# Patient Record
Sex: FEMALE | Race: BLACK OR AFRICAN AMERICAN | Employment: OTHER | URBAN - METROPOLITAN AREA
[De-identification: names, ages, dates, MRNs, and addresses within clinical notes are randomized per-mention and may not be internally consistent; named-entity substitution may affect disease eponyms.]

---

## 2017-02-15 ENCOUNTER — OFFICE VISIT (OUTPATIENT)
Dept: INTERNAL MEDICINE CLINIC | Age: 82
End: 2017-02-15

## 2017-02-15 VITALS
SYSTOLIC BLOOD PRESSURE: 126 MMHG | DIASTOLIC BLOOD PRESSURE: 65 MMHG | BODY MASS INDEX: 25.72 KG/M2 | RESPIRATION RATE: 16 BRPM | OXYGEN SATURATION: 100 % | HEIGHT: 65 IN | WEIGHT: 154.4 LBS | HEART RATE: 85 BPM | TEMPERATURE: 97.2 F

## 2017-02-15 DIAGNOSIS — Z00.00 ROUTINE GENERAL MEDICAL EXAMINATION AT A HEALTH CARE FACILITY: ICD-10-CM

## 2017-02-15 DIAGNOSIS — Z23 ENCOUNTER FOR IMMUNIZATION: ICD-10-CM

## 2017-02-15 NOTE — PATIENT INSTRUCTIONS
DTaP (Diphtheria, Tetanus, Pertussis) Vaccine: What You Need to Know  Why get vaccinated? Diphtheria, tetanus, and pertussis are serious diseases caused by bacteria. Diphtheria and pertussis are spread from person to person. Tetanus enters the body through cuts or wounds. DIPHTHERIA causes a thick covering in the back of the throat. · It can lead to breathing problems, paralysis, heart failure, and even death. TETANUS (Lockjaw) causes painful tightening of the muscles, usually all over the body. · It can lead to \"locking\" of the jaw so the victim cannot open his mouth or swallow. Tetanus leads to death in up to 2 out of 10 cases. PERTUSSIS (Whooping Cough) causes coughing spells so bad that it is hard for infants to eat, drink, or breathe. These spells can last for weeks. · It can lead to pneumonia, seizures (jerking and staring spells), brain damage, and death. Diphtheria, tetanus, and pertussis vaccine (DTaP) can help prevent these diseases. Most children who are vaccinated with DTaP will be protected throughout childhood. Many more children would get these diseases if we stopped vaccinating. DTaP is a safer version of an older vaccine called DTP. DTP is no longer used in the [place]. Who should get DTaP vaccine and when? Children should get 5 doses of DTaP vaccine, one dose at each of the following ages:  · 2 months  · 4 months  · 6 months  · 1518 months  · 46 years  DTaP may be given at the same time as other vaccines. Some children should not get DTaP vaccine or should wait. · Children with minor illnesses, such as a cold, may be vaccinated. But children who are moderately or severely ill should usually wait until they recover before getting DTaP vaccine. · Any child who had a life-threatening allergic reaction after a dose of DTaP should not get another dose.   · Any child who suffered a brain or nervous system disease within 7 days after a dose of DTaP should not get another dose.  · Talk with your doctor if your child:  Tari Had a seizure or collapsed after a dose of DTaP. ¨ Cried non-stop for 3 hours or more after a dose of DTaP. ¨ Had a fever over 105°F after a dose of DTaP. Ask your doctor for more information. Some of these children should not get another dose of pertussis vaccine, but may get a vaccine without pertussis, called DT. Older children and adults  DTaP is not licensed for adolescents, adults, or children 9years of age and older. But older people still need protection. A vaccine called Tdap is similar to DTaP. A single dose of Tdap is recommended for people 11 through 59years of age. Another vaccine, called Td, protects against tetanus and diphtheria, but not pertussis. It is recommended every 10 years. There are separate Vaccine Information Statements for these vaccines. What are the risks from DTaP vaccine? Getting diphtheria, tetanus, or pertussis disease is much riskier than getting DTaP vaccine. However, a vaccine, like any medicine, is capable of causing serious problems, such as severe allergic reactions. The risk of DTaP vaccine causing serious harm, or death, is extremely small. Mild Problems (Common)  · Fever (up to about 1 child in 4)  · Redness or swelling where the shot was given (up to about 1 child in 4)  · Soreness or tenderness where the shot was given (up to about 1 child in 4)  These problems occur more often after the 4th and 5th doses of the DTaP series than after earlier doses. Sometimes the 4th or 5th dose of DTaP vaccine is followed by swelling of the entire arm or leg in which the shot was given, lasting 17 days (up to about 1 child in 27). Other mild problems include:  · Fussiness (up to about 1 child in 3)  · Tiredness or poor appetite (up to about 1 child in 10)  · Vomiting (up to about 1 child in 48)  These problems generally occur 13 days after the shot.   Moderate Problems (Uncommon)  · Seizure (jerking or staring) (about 1 child out of 14,000)  · Non-stop crying, for 3 hours or more (up to about 1 child out of 1,000)  · High fever, over 105°F (about 1 child out of 16,000)  Severe Problems (Very Rare)  · Serious allergic reaction (less than 1 out of a million doses)  · Several other severe problems have been reported after DTaP vaccine. These include:  ¨ Long-term seizures, coma, or lowered consciousness. ¨ Permanent brain damage. These are so rare it is hard to tell if they are caused by the vaccine. Controlling fever is especially important for children who have had seizures, for any reason. It is also important if another family member has had seizures. You can reduce fever and pain by giving your child an aspirin-free pain reliever when the shot is given, and for the next 24 hours, following the package instructions. What if there is a serious reaction? What should I look for? · Look for anything that concerns you, such as signs of a severe allergic reaction, very high fever, or behavior changes. Signs of a severe allergic reaction can include hives, swelling of the face and throat, difficulty breathing, a fast heartbeat, dizziness, and weakness. These would start a few minutes to a few hours after the vaccination. What should I do? · If you think it is a severe allergic reaction or other emergency that can't wait, call 9-1-1 or get the person to the nearest hospital. Otherwise, call your doctor. · Afterward, the reaction should be reported to the Vaccine Adverse Event Reporting System (VAERS). Your doctor might file this report, or you can do it yourself through the VAERS web site at www.vaers. hhs.gov, or by calling 0-432.882.2361. VAERS is only for reporting reactions. They do not give medical advice. The National Vaccine Injury Compensation Program  The National Vaccine Injury Compensation Program (VICP) is a federal program that was created to compensate people who may have been injured by certain vaccines.   Persons who believe they may have been injured by a vaccine can learn about the program and about filing a claim by calling 0-818.190.2363 or visiting the [address] [employer] website at www.[facility].gov/vaccinecompensation. How can I learn more? · Ask your doctor. · Call your local or state health department. · Contact the Centers for Disease Control and Prevention (CDC):  ¨ Call 4-721.545.4059 (1-800-CDC-INFO) or  ¨ Visit CDC's website at www.cdc.gov/vaccines  Vaccine Information Statement  DTaP (Tetanus, Diphtheria, Pertussis ) Vaccine  (5/17/2007)  [address] [id number]  Department of Health and Human Services  Centers for Disease Control and Prevention  Many Vaccine Information Statements are available in [place] and other languages. See www.immunize.org/vis. Muchas hojas de información sobre vacunas están disponibles en español y en otros idiomas. Visite www.immunize.org/vis. Care instructions adapted under license by your healthcare professional. If you have questions about a medical condition or this instruction, always ask your healthcare professional. [address] any warranty or liability for your use of this information. Meclizine (By mouth)   Meclizine (MEK-li-zeen)  Treats symptoms of motion sickness. Also treats vertigo. Brand Name(s): Antivert, Antivert/25, Antivert/50, [employer] Motion Sickness Relief, Meclicot, Medi-Meclizine, Motion Relief, Motion Sickness Relief, Motion-Time, [employer] Motion Sickness Relief   There may be other brand names for this medicine. When This Medicine Should Not Be Used:   Do not use this medicine if you have had an allergic reaction to meclizine. How to Use This Medicine:   Capsule, Tablet, Chewable Tablet  · Your doctor will tell you how much medicine to use. Do not use more than directed. · Chewable tablet: Chew the tablet for at least 2 minutes. · Motion sickness: Take this medicine at least 1 hour before travel if you are using it to prevent motion sickness. One dose of this medicine should prevent motion sickness for 24 hours. If a dose is missed:   · This medicine is usually taken only as needed. If you are taking meclizine regularly, take your missed dose as soon as you remember. However, if it is almost time for your next dose, wait until then to take the medicine and skip the missed dose. Do not use extra medicine to make up for a missed dose. How to Store and Dispose of This Medicine:   · Store the medicine in a closed container at room temperature, away from heat, moisture, and direct light. · Ask your pharmacist, doctor, or health caregiver about the best way to dispose of any outdated medicine or medicine no longer needed. · Keep all medicine out of the reach of children. Never share your medicine with anyone. Drugs and Foods to Avoid:   Ask your doctor or pharmacist before using any other medicine, including over-the-counter medicines, vitamins, and herbal products. · Do not drink alcohol while you are using this medicine. · Tell your doctor if you use anything else that makes you sleepy. Some examples are allergy medicine, narcotic pain medicine, and alcohol. Warnings While Using This Medicine:   · Make sure your doctor knows if you are pregnant or breastfeeding, or if you have kidney disease, liver disease, asthma, enlarged prostate, glaucoma, heart failure, or trouble urinating. · This medicine may make you drowsy. Do not drive, use machines, or do anything else that could be dangerous until you know how this medicine affects you.   Possible Side Effects While Using This Medicine:   Call your doctor right away if you notice any of these side effects:  · Allergic reaction: Itching or hives, swelling in your face or hands, swelling or tingling in your mouth or throat, chest tightness, trouble breathing  If you notice these less serious side effects, talk with your doctor:   · Blurred vision  · Drowsiness  · Dry mouth  · Headache  If you notice other side effects that you think are caused by this medicine, tell your doctor. Call your doctor for medical advice about side effects. You may report side effects to FDA at 4-475-YCS-6683  © 2016 [address] is for End User's use only and may not be sold, redistributed or otherwise used for commercial purposes. The above information is an  only. It is not intended as medical advice for individual conditions or treatments. Talk to your doctor, nurse or pharmacist before following any medical regimen to see if it is safe and effective for you.

## 2017-02-15 NOTE — PROGRESS NOTES
This is a Subsequent Medicare Annual Wellness Visit providing Personalized Prevention Plan Services (PPPS) (Performed 12 months after initial AWV and PPPS )    I have reviewed the patient's medical history in detail and updated the computerized patient record. History   79 yo female here for [address] Feels well. No complaints at this time other than intermittent vertigo sx. These are chronic and respond to positional changes. Prediabetes which has been well controlled. She is UTD with eye exam (Dr. Evelio Scott). Past Medical History   Diagnosis Date    ASVD (arteriosclerotic vascular disease)     Breast cancer ([facility] [address]      Left breast Cancer 1996    Diabetes ([facility] [address]     Hypercholesterolemia     Hypertension     Radiation therapy      Post Left breast Cancer 1996    Radiation therapy complication 4151     Lt breast      Past Surgical History   Procedure Laterality Date    Hx cataract removal      Hx orthopaedic      Pr breast surgery procedure unlisted       Post Left breast cancer 1996    Hx breast lumpectomy Left 1996     breast cancer    Hx breast biopsy Right ? Scar on right breast pt does not remember from what     Current Outpatient Prescriptions   Medication Sig Dispense Refill    atenolol-chlorthalidone (TENORETIC) 50-25 mg per tablet take 1 tablet by mouth once daily 60 Tab 5    acetaminophen (TYLENOL) 500 mg tablet Take 1 Tab by mouth every six (6) hours as needed for Pain. 30 Tab 0    potassium chloride (K-DUR, KLOR-CON) 20 mEq tablet take 1 tablet by mouth twice a day 120 Tab 5    ipratropium (ATROVENT) 0.06 % nasal spray INSTILL 2 SPRAYS INTO EACH NOSTRIL 4 TIMES A DAY 15 mL 5    simvastatin (ZOCOR) 20 mg tablet take 1 tablet by mouth at bedtime 60 Tab 5    pentoxifylline CR (TRENTAL) 400 mg CR tablet Take 400 mg by mouth three (3) times daily (with meals).  cholecalciferol, VITAMIN D3, (VITAMIN D3) 5,000 unit tab tablet Take  by mouth daily.       mirabegron (MYRBETRIQ) 50 mg Tb24 Take  by mouth.  cycloSPORINE (RESTASIS) 0.05 % ophthalmic emulsion Administer 1 Drop to both eyes two (2) times a day.  glucose blood VI test strips (ONE TOUCH ULTRA TEST) strip Once daily 1 Package 5    glucose blood VI test strips (ASCENSIA CONTOUR) strip by Does Not Apply route daily. 1 Package 11    omega-3 fatty acids-vitamin e (FISH OIL) 1,000 mg Cap Take 1 Cap by mouth.  calcium 500 mg Tab Take  by mouth.  aspirin 81 mg tablet Take 81 mg by mouth.  multivitamin (ONE A DAY) tablet Take 1 Tab by mouth daily.  Blood-Glucose Meter (CONTOUR METER) monitoring kit by Does Not Apply route. Monitor daily 1 Kit 0    diclofenac (VOLTAREN) 1 % gel Apply  to affected area four (4) times daily. 2 g 0    fexofenadine (ALLEGRA) 180 mg tablet Take 1 Tab by mouth daily. 60 Tab 3     Allergies   Allergen Reactions    Sulfa (Sulfonamide Antibiotics) Swelling     Swelling in mouth     History reviewed. No pertinent family history. 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 Requires assistance with: no ADLs    Fall Risk     Fall Risk Assessment, last 12 mths 2/15/2017   Able to walk? Yes   Fall in past 12 months?  No   Uses cane    Abuse Screen   None  Patient is not abused    Review of Systems   Constitutional: negative  Ears, nose, mouth, throat, and face: runny nose during different seasons  Respiratory: negative  Cardiovascular: negative  Gastrointestinal: positive for constipation  Musculoskeletal:negative  Behavioral/Psych: negative    Physical Examination     Evaluation of Cognitive Function:  Mood/affect:  happy  Appearance: age appropriate, well dressed and within normal Limits  Family member/caregiver input: none    General appearance: alert, cooperative, no distress, appears stated age  Nose: no discharge  Lungs: clear to auscultation bilaterally  Heart: regular rate and rhythm, S1, S2 normal, no murmur, click, rub or gallop  Extremities: extremities normal, atraumatic, no cyanosis or edema    Patient Care Team:  Rufina Nelson MD as PCP - General (Internal Medicine)  Bobo Silva RN as Nurse Navigator  Kathryn Barber MD (Ophthalmology)  Tatiana Sin DPM (Podiatry)  Lj Colon MD (Obstetrics & Gynecology)    Advice/Referrals/Counseling   Education and counseling provided:  TDap  UTD with other vaccines. Discussed intermittent vertigo. Provided information on meclizine which she will consider. Assessment/Plan       ICD-10-CM ICD-9-CM    1. Routine general medical examination at a health care facility Z00.00 V70.0    2. Encounter for immunization Z23 V03.89 TETANUS, DIPHTHERIA TOXOIDS AND ACELLULAR PERTUSSIS VACCINE (TDAP), IN INDIVIDS. >=7, IM   TDap today. Doing well functionally. Discussed driving safety. Continued healthy lifestyle.

## 2017-02-15 NOTE — MR AVS SNAPSHOT
Visit Information Date & Time Provider Department Dept. Phone Encounter #  
 2/15/2017 11:15 AM Madison Auguste MD [facility] [phone] [id number] Follow-up Instructions Return in about 6 months (around 8/15/2017), or if symptoms worsen or fail to improve. Upcoming Health Maintenance Date Due DTaP/Tdap/Td series (1 - Tdap) 10/21/1954 EYE EXAM RETINAL OR DILATED Q1 7/24/2013 MEDICARE YEARLY EXAM 1/13/2017 LIPID PANEL Q1 5/13/2017 GLAUCOMA SCREENING Q2Y 8/5/2017 HEMOGLOBIN A1C Q6M 8/15/2017 FOOT EXAM Q1 2/15/2018 MICROALBUMIN Q1 2/15/2018 Allergies as of 2/15/2017  Review Complete On: 2/15/2017 By: Madison Auguste MD  
  
 Severity Noted Reaction Type Reactions Sulfa (Sulfonamide Antibiotics)  02/05/2015    Swelling Swelling in mouth Current Immunizations  Reviewed on 10/22/2014 Name Date Influenza High Dose Vaccine PF 10/14/2016 Influenza Vaccine 10/17/2014 Pneumococcal Conjugate (PCV-13) 10/14/2016, 1/13/2016 Pneumococcal Polysaccharide (PPSV-23) 5/5/2015 Tdap  Incomplete Zoster Vaccine, Live 1/1/2014 Not reviewed this visit You Were Diagnosed With   
  
 Codes Comments Routine general medical examination at a health care facility     ICD-10-CM: Z00.00 ICD-9-CM: V70.0 Encounter for immunization     ICD-10-CM: S07 ICD-9-CM: V03.89 Vitals BP Pulse Temp Resp Height(growth percentile) Weight(growth percentile) 126/65 (BP 1 Location: Left arm, BP Patient Position: Sitting) 85 97.2 °F (36.2 °C) (Oral) 16 5' 5\" (1.651 m) 154 lb 6.4 oz (70 kg) SpO2 BMI OB Status Smoking Status 100% 25.69 kg/m2 Postmenopausal Never Smoker Vitals History BMI and BSA Data Body Mass Index Body Surface Area  
 25.69 kg/m 2 1.79 m 2 Preferred Pharmacy Pharmacy Name Phone [address] [address] [address] [facility] [phone] Your Updated Medication List  
  
   
This list is accurate as of: 2/15/17 11:27 AM.  Always use your most recent med list.  
  
  
  
  
 acetaminophen 500 mg tablet Commonly known as:  TYLENOL Take 1 Tab by mouth every six (6) hours as needed for Pain. aspirin 81 mg tablet Take 81 mg by mouth. atenolol-chlorthalidone 50-25 mg per tablet Commonly known as:  TENORETIC  
take 1 tablet by mouth once daily Blood-Glucose Meter monitoring kit Commonly known as:  CONTOUR METER  
by Does Not Apply route. Monitor daily  
  
 calcium 500 mg Tab Take  by mouth. cholecalciferol (VITAMIN D3) 5,000 unit Tab tablet Commonly known as:  VITAMIN D3 Take  by mouth daily. diclofenac 1 % Gel Commonly known as:  VOLTAREN Apply  to affected area four (4) times daily. fexofenadine 180 mg tablet Commonly known as:  Sumi Cyphers Take 1 Tab by mouth daily. FISH OIL 1,000 mg Cap Generic drug:  omega-3 fatty acids-vitamin e Take 1 Cap by mouth. * glucose blood VI test strips strip Commonly known as:  Ascensia CONTOUR  
by Does Not Apply route daily. * glucose blood VI test strips strip Commonly known as:  ONETOUCH ULTRA TEST Once daily  
  
 ipratropium 0.06 % nasal spray Commonly known as:  ATROVENT INSTILL 2 SPRAYS INTO EACH NOSTRIL 4 TIMES A DAY  
  
 multivitamin tablet Commonly known as:  ONE A DAY Take 1 Tab by mouth daily. MYRBETRIQ 50 mg ER tablet Generic drug:  mirabegron ER Take  by mouth.  
  
 pentoxifylline  mg CR tablet Commonly known as:  TRENTAL Take 400 mg by mouth three (3) times daily (with meals). potassium chloride 20 mEq tablet Commonly known as:  K-DUR, KLOR-CON  
take 1 tablet by mouth twice a day RESTASIS 0.05 % ophthalmic emulsion Generic drug:  cycloSPORINE Administer 1 Drop to both eyes two (2) times a day. simvastatin 20 mg tablet Commonly known as:  ZOCOR  
 take 1 tablet by mouth at bedtime * Notice: This list has 2 medication(s) that are the same as other medications prescribed for you. Read the directions carefully, and ask your doctor or other care provider to review them with you. We Performed the Following TETANUS, DIPHTHERIA TOXOIDS AND ACELLULAR PERTUSSIS VACCINE (TDAP), IN INDIVIDS. >=7, IM [id number] CPT(R)] Follow-up Instructions Return in about 6 months (around 8/15/2017), or if symptoms worsen or fail to improve. Patient Instructions DTaP (Diphtheria, Tetanus, Pertussis) Vaccine: What You Need to Know Why get vaccinated? Diphtheria, tetanus, and pertussis are serious diseases caused by bacteria. Diphtheria and pertussis are spread from person to person. Tetanus enters the body through cuts or wounds. DIPHTHERIA causes a thick covering in the back of the throat. · It can lead to breathing problems, paralysis, heart failure, and even death. TETANUS (Lockjaw) causes painful tightening of the muscles, usually all over the body. · It can lead to \"locking\" of the jaw so the victim cannot open his mouth or swallow. Tetanus leads to death in up to 2 out of 10 cases. PERTUSSIS (Whooping Cough) causes coughing spells so bad that it is hard for infants to eat, drink, or breathe. These spells can last for weeks. · It can lead to pneumonia, seizures (jerking and staring spells), brain damage, and death. Diphtheria, tetanus, and pertussis vaccine (DTaP) can help prevent these diseases. Most children who are vaccinated with DTaP will be protected throughout childhood. Many more children would get these diseases if we stopped vaccinating. DTaP is a safer version of an older vaccine called DTP. DTP is no longer used in the [place]. Who should get DTaP vaccine and when? Children should get 5 doses of DTaP vaccine, one dose at each of the following ages: · 2 months · 4 months · 6 months · 1518 months · 46 years DTaP may be given at the same time as other vaccines. Some children should not get DTaP vaccine or should wait. · Children with minor illnesses, such as a cold, may be vaccinated. But children who are moderately or severely ill should usually wait until they recover before getting DTaP vaccine. · Any child who had a life-threatening allergic reaction after a dose of DTaP should not get another dose. · Any child who suffered a brain or nervous system disease within 7 days after a dose of DTaP should not get another dose. · Talk with your doctor if your child: 
[place].Levels Had a seizure or collapsed after a dose of DTaP. ¨ Cried non-stop for 3 hours or more after a dose of DTaP. ¨ Had a fever over 105°F after a dose of DTaP. Ask your doctor for more information. Some of these children should not get another dose of pertussis vaccine, but may get a vaccine without pertussis, called DT. Older children and adults DTaP is not licensed for adolescents, adults, or children 9years of age and older. But older people still need protection. A vaccine called Tdap is similar to DTaP. A single dose of Tdap is recommended for people 11 through 59years of age. Another vaccine, called Td, protects against tetanus and diphtheria, but not pertussis. It is recommended every 10 years. There are separate Vaccine Information Statements for these vaccines. What are the risks from DTaP vaccine? Getting diphtheria, tetanus, or pertussis disease is much riskier than getting DTaP vaccine. However, a vaccine, like any medicine, is capable of causing serious problems, such as severe allergic reactions. The risk of DTaP vaccine causing serious harm, or death, is extremely small. Mild Problems (Common) · Fever (up to about 1 child in 4) · Redness or swelling where the shot was given (up to about 1 child in 4) · Soreness or tenderness where the shot was given (up to about 1 child in 4) These problems occur more often after the 4th and 5th doses of the DTaP series than after earlier doses. Sometimes the 4th or 5th dose of DTaP vaccine is followed by swelling of the entire arm or leg in which the shot was given, lasting 17 days (up to about 1 child in 27). Other mild problems include: · Fussiness (up to about 1 child in 3) · Tiredness or poor appetite (up to about 1 child in 10) · Vomiting (up to about 1 child in 48) These problems generally occur 13 days after the shot. Moderate Problems (Uncommon) · Seizure (jerking or staring) (about 1 child out of 14,000) · Non-stop crying, for 3 hours or more (up to about 1 child out of 1,000) · High fever, over 105°F (about 1 child out of 16,000) Severe Problems (Very Rare) · Serious allergic reaction (less than 1 out of a million doses) · Several other severe problems have been reported after DTaP vaccine. These include: 
¨ Long-term seizures, coma, or lowered consciousness. ¨ Permanent brain damage. These are so rare it is hard to tell if they are caused by the vaccine. Controlling fever is especially important for children who have had seizures, for any reason. It is also important if another family member has had seizures. You can reduce fever and pain by giving your child an aspirin-free pain reliever when the shot is given, and for the next 24 hours, following the package instructions. What if there is a serious reaction? What should I look for? · Look for anything that concerns you, such as signs of a severe allergic reaction, very high fever, or behavior changes. Signs of a severe allergic reaction can include hives, swelling of the face and throat, difficulty breathing, a fast heartbeat, dizziness, and weakness. These would start a few minutes to a few hours after the vaccination. What should I do?  
· If you think it is a severe allergic reaction or other emergency that can't wait, call 9-1-1 or get the person to the nearest hospital. Otherwise, call your doctor. · Afterward, the reaction should be reported to the Vaccine Adverse Event Reporting System (VAERS). Your doctor might file this report, or you can do it yourself through the VAERS web site at www.vaers. [facility].gov, or by calling 0-666.488.5343. VAERS is only for reporting reactions. They do not give medical advice. The National Vaccine Injury Compensation Program 
The National Vaccine Injury Compensation Program (VICP) is a federal program that was created to compensate people who may have been injured by certain vaccines. Persons who believe they may have been injured by a vaccine can learn about the program and about filing a claim by calling 5-535.447.4815 or visiting the [employer] website at www.[place].gov/vaccinecompensation. How can I learn more? · Ask your doctor. · Call your local or state health department. · Contact the Centers for Disease Control and Prevention (CDC): 
¨ Call 3-202.116.7544 (1-800-CDC-INFO) or ¨ Visit CDC's website at www.cdc.gov/vaccines Vaccine Information Statement DTaP (Tetanus, Diphtheria, Pertussis ) Vaccine 
(5/17/2007) [address] [facility] 532EW-20 [facility] and [employer] Centers for Disease Control and Prevention Many Vaccine Information Statements are available in [place] and other languages. See www.immunize.org/vis. Muchas hojas de información sobre vacunas están disponibles en español y en otros idiomas. Visite www.immunize.org/vis. Care instructions adapted under license by your healthcare professional. If you have questions about a medical condition or this instruction, always ask your healthcare professional. [address] any warranty or liability for your use of this information. Meclizine (By mouth) Meclizine (MEK-li-zeen) Treats symptoms of motion sickness. Also treats vertigo. Brand Name(s): Antivert, Antivert/25, Antivert/50, [employer] Motion Sickness Relief, Meclicot, Medi-Meclizine, Motion Relief, Motion Sickness Relief, Motion-Time, [employer] Motion Sickness Relief There may be other brand names for this medicine. When This Medicine Should Not Be Used: Do not use this medicine if you have had an allergic reaction to meclizine. How to Use This Medicine:  
Capsule, Tablet, Chewable Tablet · Your doctor will tell you how much medicine to use. Do not use more than directed. · Chewable tablet: Chew the tablet for at least 2 minutes. · Motion sickness: Take this medicine at least 1 hour before travel if you are using it to prevent motion sickness. One dose of this medicine should prevent motion sickness for 24 hours. If a dose is missed: · This medicine is usually taken only as needed. If you are taking meclizine regularly, take your missed dose as soon as you remember. However, if it is almost time for your next dose, wait until then to take the medicine and skip the missed dose. Do not use extra medicine to make up for a missed dose. How to Store and Dispose of This Medicine: · Store the medicine in a closed container at room temperature, away from heat, moisture, and direct light. · Ask your pharmacist, doctor, or health caregiver about the best way to dispose of any outdated medicine or medicine no longer needed. · Keep all medicine out of the reach of children. Never share your medicine with anyone. Drugs and Foods to Avoid: Ask your doctor or pharmacist before using any other medicine, including over-the-counter medicines, vitamins, and herbal products. · Do not drink alcohol while you are using this medicine. · Tell your doctor if you use anything else that makes you sleepy. Some examples are allergy medicine, narcotic pain medicine, and alcohol. Warnings While Using This Medicine: · Make sure your doctor knows if you are pregnant or breastfeeding, or if you have kidney disease, liver disease, asthma, enlarged prostate, glaucoma, heart failure, or trouble urinating. · This medicine may make you drowsy. Do not drive, use machines, or do anything else that could be dangerous until you know how this medicine affects you. Possible Side Effects While Using This Medicine:  
Call your doctor right away if you notice any of these side effects: · Allergic reaction: Itching or hives, swelling in your face or hands, swelling or tingling in your mouth or throat, chest tightness, trouble breathing If you notice these less serious side effects, talk with your doctor: · Blurred vision · Drowsiness · Dry mouth 
· Headache If you notice other side effects that you think are caused by this medicine, tell your doctor. Call your doctor for medical advice about side effects. You may report side effects to FDA at 0-662-FDA-3687 © 2016 [address] is for End User's use only and may not be sold, redistributed or otherwise used for commercial purposes. The above information is an  only. It is not intended as medical advice for individual conditions or treatments. Talk to your doctor, nurse or pharmacist before following any medical regimen to see if it is safe and effective for you. Introducing [facility]! [facility] introduces BooknGo patient portal. Now you can access parts of your medical record, email your doctor's office, and request medication refills online. 1. In your internet browser, go to [email or web address] 2. Click on the First Time User? Click Here link in the Sign In box. You will see the New Member Sign Up page. 3. Enter your BooknGo Access Code exactly as it appears below. You will not need to use this code after youve completed the sign-up process. If you do not sign up before the expiration date, you must request a new code.  
 
· BooknGo Access Code: [email or web address] 
 Expires: 5/16/2017 11:09 AM 
 
4. Enter the last four digits of your Social Security Number (xxxx) and Date of Birth (mm/dd/yyyy) as indicated and click Submit. You will be taken to the next sign-up page. 5. Create a Productiv ID. This will be your Productiv login ID and cannot be changed, so think of one that is secure and easy to remember. 6. Create a Productiv password. You can change your password at any time. 7. Enter your Password Reset Question and Answer. This can be used at a later time if you forget your password. 8. Enter your e-mail address. You will receive e-mail notification when new information is available in [address] 9. Click Sign Up. You can now view and download portions of your medical record. 10. Click the Download Summary menu link to download a portable copy of your medical information. If you have questions, please visit the Frequently Asked Questions section of the Productiv website. Remember, Productiv is NOT to be used for urgent needs. For medical emergencies, dial 911. Now available from your iPhone and Android! Please provide this summary of care documentation to your next provider. Your primary care clinician is listed as Alma Eisenberg. If you have any questions after today's visit, please call [phone].

## 2017-02-15 NOTE — PROGRESS NOTES
Patient presents today for F/U   Pt preferred language for healthcare discussion is english. Do you have an advanced directive? No  Is someone accompanying this pt? If so who? No   Is the patient using any DME equipment during OV? No What equipment? 1. Have you been to the ER, urgent care clinic since your last visit? Hospitalized since your last visit?  No

## 2017-05-31 RX ORDER — SIMVASTATIN 20 MG/1
TABLET, FILM COATED ORAL
Qty: 60 TAB | Refills: 5 | Status: SHIPPED | OUTPATIENT
Start: 2017-05-31 | End: 2018-05-01 | Stop reason: SDUPTHER

## 2017-05-31 NOTE — TELEPHONE ENCOUNTER
Requested Prescriptions     Pending Prescriptions Disp Refills    simvastatin (ZOCOR) 20 mg tablet 60 Tab 5     Sig: take 1 tablet by mouth at bedtime

## 2017-08-15 ENCOUNTER — HOSPITAL ENCOUNTER (OUTPATIENT)
Dept: LAB | Age: 82
Discharge: HOME | End: 2017-08-15
Payer: MEDICARE

## 2017-08-15 ENCOUNTER — OFFICE VISIT (OUTPATIENT)
Dept: INTERNAL MEDICINE CLINIC | Age: 82
End: 2017-08-15

## 2017-08-15 VITALS
WEIGHT: 154.2 LBS | OXYGEN SATURATION: 99 % | SYSTOLIC BLOOD PRESSURE: 136 MMHG | BODY MASS INDEX: 25.69 KG/M2 | TEMPERATURE: 98.2 F | RESPIRATION RATE: 16 BRPM | DIASTOLIC BLOOD PRESSURE: 60 MMHG | HEART RATE: 82 BPM | HEIGHT: 65 IN

## 2017-08-15 DIAGNOSIS — I10 ESSENTIAL HYPERTENSION: ICD-10-CM

## 2017-08-15 DIAGNOSIS — E11.9 TYPE 2 DIABETES MELLITUS WITHOUT COMPLICATION, WITHOUT LONG-TERM CURRENT USE OF INSULIN (HCC): ICD-10-CM

## 2017-08-15 DIAGNOSIS — M19.90 ARTHRITIS: ICD-10-CM

## 2017-08-15 DIAGNOSIS — E78.00 HYPERCHOLESTEROLEMIA: ICD-10-CM

## 2017-08-15 DIAGNOSIS — E11.9 TYPE 2 DIABETES MELLITUS WITHOUT COMPLICATION, WITHOUT LONG-TERM CURRENT USE OF INSULIN (HCC): Primary | ICD-10-CM

## 2017-08-15 LAB
ALBUMIN SERPL BCP-MCNC: 3.5 G/DL (ref 3.4–5)
ALBUMIN/GLOB SERPL: 1 {RATIO} (ref 0.8–1.7)
ALP SERPL-CCNC: 107 U/L (ref 45–117)
ALT SERPL-CCNC: 30 U/L (ref 13–56)
ANION GAP BLD CALC-SCNC: 8 MMOL/L (ref 3–18)
AST SERPL W P-5'-P-CCNC: 25 U/L (ref 15–37)
BILIRUB SERPL-MCNC: 0.4 MG/DL (ref 0.2–1)
BUN SERPL-MCNC: 12 MG/DL (ref 7–18)
BUN/CREAT SERPL: 20 (ref 12–20)
CALCIUM SERPL-MCNC: 9.9 MG/DL (ref 8.5–10.1)
CHLORIDE SERPL-SCNC: 102 MMOL/L (ref 100–108)
CHOLEST SERPL-MCNC: 146 MG/DL
CO2 SERPL-SCNC: 31 MMOL/L (ref 21–32)
CREAT SERPL-MCNC: 0.61 MG/DL (ref 0.6–1.3)
ERYTHROCYTE [DISTWIDTH] IN BLOOD BY AUTOMATED COUNT: 15.2 % (ref 11.6–14.5)
EST. AVERAGE GLUCOSE BLD GHB EST-MCNC: 114 MG/DL
GLOBULIN SER CALC-MCNC: 3.6 G/DL (ref 2–4)
GLUCOSE SERPL-MCNC: 107 MG/DL (ref 74–99)
HBA1C MFR BLD: 5.6 % (ref 4.2–5.6)
HCT VFR BLD AUTO: 42.2 % (ref 35–45)
HDLC SERPL-MCNC: 81 MG/DL (ref 40–60)
HDLC SERPL: 1.8 {RATIO} (ref 0–5)
HGB BLD-MCNC: 13.6 G/DL (ref 12–16)
LDLC SERPL CALC-MCNC: 48.4 MG/DL (ref 0–100)
LIPID PROFILE,FLP: ABNORMAL
MCH RBC QN AUTO: 23 PG (ref 24–34)
MCHC RBC AUTO-ENTMCNC: 32.2 G/DL (ref 31–37)
MCV RBC AUTO: 71.4 FL (ref 74–97)
PLATELET # BLD AUTO: 256 K/UL (ref 135–420)
PMV BLD AUTO: 10.9 FL (ref 9.2–11.8)
POTASSIUM SERPL-SCNC: 3.8 MMOL/L (ref 3.5–5.5)
PROT SERPL-MCNC: 7.1 G/DL (ref 6.4–8.2)
RBC # BLD AUTO: 5.91 M/UL (ref 4.2–5.3)
SODIUM SERPL-SCNC: 141 MMOL/L (ref 136–145)
TRIGL SERPL-MCNC: 83 MG/DL (ref ?–150)
VLDLC SERPL CALC-MCNC: 16.6 MG/DL
WBC # BLD AUTO: 8.7 K/UL (ref 4.6–13.2)

## 2017-08-15 PROCEDURE — 83036 HEMOGLOBIN GLYCOSYLATED A1C: CPT | Performed by: INTERNAL MEDICINE

## 2017-08-15 PROCEDURE — 85027 COMPLETE CBC AUTOMATED: CPT | Performed by: INTERNAL MEDICINE

## 2017-08-15 PROCEDURE — 80053 COMPREHEN METABOLIC PANEL: CPT | Performed by: INTERNAL MEDICINE

## 2017-08-15 PROCEDURE — 36415 COLL VENOUS BLD VENIPUNCTURE: CPT | Performed by: INTERNAL MEDICINE

## 2017-08-15 PROCEDURE — 80061 LIPID PANEL: CPT | Performed by: INTERNAL MEDICINE

## 2017-08-15 RX ORDER — LORATADINE 10 MG/1
10 TABLET ORAL
COMMUNITY

## 2017-08-15 NOTE — PROGRESS NOTES
HISTORY OF PRESENT ILLNESS  La Fuentes is a 80 y.o. female. HPI Comments: 79 yo female here for f/u of DM, HTN, HLD, OA. Knee OA. No pain but some swelling and stiffness. Difficulty with walking distances due to this. R thumb joint enlargement. Not painful. ROM okay. DM glc 80s when checked  Some lip swelling at times. Improved after stopping med from podiatry  HTN Controlled. Skin discoloration L knee is unchanged. HLD Controlled on labs last year. UI: has apptwith urogyn    Hypertension    Pertinent negatives include no chest pain, no palpitations, no blurred vision, no headaches, no dizziness, no shortness of breath, no nausea and no vomiting. Review of Systems   Constitutional: Negative for chills, fever and weight loss. HENT: Negative for congestion. Eyes: Negative for blurred vision and pain. Respiratory: Negative for cough and shortness of breath. Cardiovascular: Negative for chest pain, palpitations and leg swelling. Gastrointestinal: Negative for heartburn, nausea and vomiting. Genitourinary: Positive for urgency. Negative for dysuria. Musculoskeletal: Negative for joint pain and myalgias. Skin: Negative for itching and rash. Neurological: Negative for dizziness, tingling and headaches. Psychiatric/Behavioral: Negative for depression. The patient is not nervous/anxious.       Past Medical History:   Diagnosis Date    ASVD (arteriosclerotic vascular disease)     Breast cancer ([facility] [address]     Left breast Cancer 1996    Diabetes ([facility] [address]     Hypercholesterolemia     Hypertension     Radiation therapy     Post Left breast Cancer 1996    Radiation therapy complication 3748    Lt breast     Current Outpatient Prescriptions on File Prior to Visit   Medication Sig Dispense Refill    simvastatin (ZOCOR) 20 mg tablet take 1 tablet by mouth at bedtime 60 Tab 5    atenolol-chlorthalidone (TENORETIC) 50-25 mg per tablet take 1 tablet by mouth once daily 60 Tab 5    acetaminophen (TYLENOL) 500 mg tablet Take 1 Tab by mouth every six (6) hours as needed for Pain. 30 Tab 0    diclofenac (VOLTAREN) 1 % gel Apply  to affected area four (4) times daily. 2 g 0    potassium chloride (K-DUR, KLOR-CON) 20 mEq tablet take 1 tablet by mouth twice a day 120 Tab 5    ipratropium (ATROVENT) 0.06 % nasal spray INSTILL 2 SPRAYS INTO EACH NOSTRIL 4 TIMES A DAY 15 mL 5    pentoxifylline CR (TRENTAL) 400 mg CR tablet Take 400 mg by mouth three (3) times daily (with meals).  cholecalciferol, VITAMIN D3, (VITAMIN D3) 5,000 unit tab tablet Take  by mouth daily.  cycloSPORINE (RESTASIS) 0.05 % ophthalmic emulsion Administer 1 Drop to both eyes two (2) times a day.  glucose blood VI test strips (ONE TOUCH ULTRA TEST) strip Once daily 1 Package 5    glucose blood VI test strips (ASCENSIA CONTOUR) strip by Does Not Apply route daily. 1 Package 11    omega-3 fatty acids-vitamin e (FISH OIL) 1,000 mg Cap Take 1 Cap by mouth.  calcium 500 mg Tab Take  by mouth.  aspirin 81 mg tablet Take 81 mg by mouth.  multivitamin (ONE A DAY) tablet Take 1 Tab by mouth daily.  Blood-Glucose Meter (CONTOUR METER) monitoring kit by Does Not Apply route. Monitor daily 1 Kit 0    fexofenadine (ALLEGRA) 180 mg tablet Take 1 Tab by mouth daily. 60 Tab 3    mirabegron (MYRBETRIQ) 50 mg Tb24 Take  by mouth. No current facility-administered medications on file prior to visit. Physical Exam   Constitutional: She appears well-developed and well-nourished. No distress. /60 (BP 1 Location: Left arm, BP Patient Position: Sitting)  Pulse 82  Temp 98.2 °F (36.8 °C) (Oral)   Resp 16  Ht 5' 5\" (1.651 m)  Wt 154 lb 3.2 oz (69.9 kg)  SpO2 99%  BMI 25.66 kg/m2     Eyes: EOM are normal. Right eye exhibits no discharge. Left eye exhibits no discharge. No scleral icterus. Neck: Neck supple. Cardiovascular: Normal rate, regular rhythm and normal heart sounds.   Exam reveals no gallop and no friction rub. No murmur heard. Pulmonary/Chest: Effort normal and breath sounds normal. No respiratory distress. She has no wheezes. She has no rales. Musculoskeletal: She exhibits deformity (R thumb DIP). She exhibits no edema or tenderness. Lymphadenopathy:     She has no cervical adenopathy. Neurological: She is alert. She exhibits normal muscle tone. Skin: Skin is warm and dry. Psychiatric: She has a normal mood and affect. Lab Results   Component Value Date/Time    Hemoglobin A1c (POC) 5.5 05/13/2016 10:45 AM     Lab Results   Component Value Date/Time    Cholesterol, total 161 05/13/2016 10:44 AM    HDL Cholesterol 66 05/13/2016 10:44 AM    LDL, calculated 61.8 05/13/2016 10:44 AM    VLDL, calculated 33.2 05/13/2016 10:44 AM    Triglyceride 166 05/13/2016 10:44 AM    CHOL/HDL Ratio 2.4 05/13/2016 10:44 AM     Lab Results   Component Value Date/Time    Sodium 142 11/16/2016 09:55 AM    Potassium 3.7 11/16/2016 09:55 AM    Chloride 103 11/16/2016 09:55 AM    CO2 31 11/16/2016 09:55 AM    Anion gap 8 11/16/2016 09:55 AM    Glucose 121 11/16/2016 09:55 AM    BUN 12 11/16/2016 09:55 AM    Creatinine 0.69 11/16/2016 09:55 AM    BUN/Creatinine ratio 17 11/16/2016 09:55 AM    GFR est AA >60 11/16/2016 09:55 AM    GFR est non-AA >60 11/16/2016 09:55 AM    Calcium 10.0 11/16/2016 09:55 AM    Bilirubin, total 0.4 11/16/2016 09:55 AM    AST (SGOT) 16 11/16/2016 09:55 AM    Alk. phosphatase 98 11/16/2016 09:55 AM    Protein, total 7.1 11/16/2016 09:55 AM    Albumin 3.6 11/16/2016 09:55 AM    Globulin 3.5 11/16/2016 09:55 AM    A-G Ratio 1.0 11/16/2016 09:55 AM    ALT (SGPT) 23 11/16/2016 09:55 AM     ASSESSMENT and PLAN    ICD-10-CM ICD-9-CM    1. Type 2 diabetes mellitus without complication, without long-term current use of insulin (HCC) E11.9 250.00 HEMOGLOBIN A1C WITH EAG   2. Essential hypertension N73 697.5 METABOLIC PANEL, COMPREHENSIVE      CBC W/O DIFF   3.  Hypercholesterolemia E78.00 272.0 LIPID PANEL   4. Arthritis M19.90 716.90 XR THUMB RT MIN 2 V   Will continue with current medication and repeat labs today. Will xray R thumb. Consider handicap DMV tags if needed. as though she is not having a lot of pain, arthritis has impacted her mobility.

## 2017-08-15 NOTE — PROGRESS NOTES
Pt presents today for F/U hypertension     Pt preferred language for health care discussion is english. Is someone accompanying this pt? no    Is the patient using any DME equipment during [address]? [place]    Depression Screening completed. yes    Health Maintenance reviewed and discussed per provider. Yes    Advance Directive:  1. Do you have an advance directive in place? Patient Reply: no, papers given. Coordination of Care:  1. Have you been to the ER, urgent care clinic since your last visit? Hospitalized since your last visit? no    2. Have you seen or consulted any other health care providers outside of the [address] [place] [address] since your last visit? Include any pap smears or colon screening.  no

## 2017-08-15 NOTE — PATIENT INSTRUCTIONS
Arthritis: Care Instructions  Your Care Instructions  Arthritis, also called osteoarthritis, is a breakdown of the cartilage that cushions your joints. When the cartilage wears down, your bones rub against each other. This causes pain and stiffness. Many people have some arthritis as they age. Arthritis most often affects the joints of the spine, hands, hips, knees, or feet. You can take simple measures to protect your joints, ease your pain, and help you stay active. Follow-up care is a key part of your treatment and safety. Be sure to make and go to all appointments, and call your doctor if you are having problems. It's also a good idea to know your test results and keep a list of the medicines you take. How can you care for yourself at home? · Stay at a healthy weight. Being overweight puts extra strain on your joints. · Talk to your doctor or physical therapist about exercises that will help ease joint pain. ¨ Stretch. You may enjoy gentle forms of yoga to help keep your joints and muscles flexible. ¨ Walk instead of jog. Other types of exercise that are less stressful on the joints include riding a bicycle, swimming, melissa chi, or water exercise. ¨ Lift weights. Strong muscles help reduce stress on your joints. Stronger thigh muscles, for example, take some of the stress off of the knees and hips. Learn the right way to lift weights so you do not make joint pain worse. · Take your medicines exactly as prescribed. Call your doctor if you think you are having a problem with your medicine. · Take pain medicines exactly as directed. ¨ If the doctor gave you a prescription medicine for pain, take it as prescribed. ¨ If you are not taking a prescription pain medicine, ask your doctor if you can take an over-the-counter medicine. · Use a cane, crutch, walker, or another device if you need help to get around. These can help rest your joints.  You also can use other things to make life easier, such as a higher toilet seat and padded handles on kitchen utensils. · Do not sit in low chairs, which can make it hard to get up. · Put heat or cold on your sore joints as needed. Use whichever helps you most. You also can take turns with hot and cold packs. ¨ Apply heat 2 or 3 times a day for 20 to 30 minutesusing a heating pad, hot shower, or hot packto relieve pain and stiffness. ¨ Put ice or a cold pack on your sore joint for 10 to 20 minutes at a time. Put a thin cloth between the ice and your skin. When should you call for help? Call your doctor now or seek immediate medical care if:  · You have sudden swelling, warmth, or pain in any joint. · You have joint pain and a fever or rash. · You have such bad pain that you cannot use a joint. Watch closely for changes in your health, and be sure to contact your doctor if:  · You have mild joint symptoms that continue even with more than 6 weeks of care at home. · You have stomach pain or other problems with your medicine. Where can you learn more? Go to http://alphonso-ami.info/. Enter U265 in the search box to learn more about \"Arthritis: Care Instructions. \"  Current as of: November 28, 2016  Content Version: 11.3  © 5312-7474 [employer]. Care instructions adapted under license by [employer] (which disclaims liability or warranty for this information). If you have questions about a medical condition or this instruction, always ask your healthcare professional. [place] [address] any warranty or liability for your use of this information.

## 2017-08-15 NOTE — MR AVS SNAPSHOT
Visit Information Date & Time Provider Department Dept. Phone Encounter #  
 8/15/2017 11:15 [address] [phone] [id number] Follow-up Instructions Return in about 6 months (around 2/15/2018) for hypertension. Upcoming Health Maintenance Date Due  
 EYE EXAM RETINAL OR DILATED Q1 7/24/2013 GLAUCOMA SCREENING Q2Y 8/5/2017 INFLUENZA AGE 9 TO ADULT 9/15/2017* FOOT EXAM Q1 2/15/2018 HEMOGLOBIN A1C Q6M 2/15/2018 MICROALBUMIN Q1 2/15/2018 MEDICARE YEARLY EXAM 2/16/2018 LIPID PANEL Q1 8/15/2018 DTaP/Tdap/Td series (2 - Td) 4/12/2022 *Topic was postponed. The date shown is not the original due date. Allergies as of 8/15/2017  Review Complete On: 2/15/2017 By: Isidra Mcmahan MD  
  
 Severity Noted Reaction Type Reactions Sulfa (Sulfonamide Antibiotics)  02/05/2015    Swelling Swelling in mouth Current Immunizations  Reviewed on 10/22/2014 Name Date Influenza High Dose Vaccine PF 10/14/2016 Influenza Vaccine 10/17/2014 Pneumococcal Conjugate (PCV-13) 10/14/2016, 1/13/2016 Pneumococcal Polysaccharide (PPSV-23) 5/5/2015 Tdap 4/12/2012 12:00 AM  
 Zoster Vaccine, Live 1/1/2014 Not reviewed this visit You Were Diagnosed With   
  
 Codes Comments Type 2 diabetes mellitus without complication, without long-term current use of insulin (HCC)    -  Primary ICD-10-CM: E11.9 ICD-9-CM: 250.00 Essential hypertension     ICD-10-CM: I10 
ICD-9-CM: 401.9 Hypercholesterolemia     ICD-10-CM: E78.00 ICD-9-CM: 272.0 Arthritis     ICD-10-CM: M19.90 ICD-9-CM: 716.90 Vitals BP Pulse Temp Resp Height(growth percentile) Weight(growth percentile) 136/60 (BP 1 Location: Left arm, BP Patient Position: Sitting) 82 98.2 °F (36.8 °C) (Oral) 16 5' 5\" (1.651 m) 154 lb 3.2 oz (69.9 kg) SpO2 BMI OB Status Smoking Status 99% 25.66 kg/m2 Postmenopausal Never Smoker Vitals History BMI and BSA Data Body Mass Index Body Surface Area  
 25.66 kg/m 2 1.79 m 2 Preferred Pharmacy Pharmacy Name Phone [address] [facility], [address] [place] [address] [address] [facility] [phone] Your Updated Medication List  
  
   
This list is accurate as of: 8/15/17  1:19 PM.  Always use your most recent med list.  
  
  
  
  
 acetaminophen 500 mg tablet Commonly known as:  TYLENOL Take 1 Tab by mouth every six (6) hours as needed for Pain. aspirin 81 mg tablet Take 81 mg by mouth. atenolol-chlorthalidone 50-25 mg per tablet Commonly known as:  TENORETIC  
take 1 tablet by mouth once daily Blood-Glucose Meter monitoring kit Commonly known as:  CONTOUR METER  
by Does Not Apply route. Monitor daily  
  
 calcium 500 mg Tab Take  by mouth. cholecalciferol (VITAMIN D3) 5,000 unit Tab tablet Commonly known as:  VITAMIN D3 Take  by mouth daily. CLARITIN 10 mg tablet Generic drug:  loratadine Take 10 mg by mouth. diclofenac 1 % Gel Commonly known as:  VOLTAREN Apply  to affected area four (4) times daily. fexofenadine 180 mg tablet Commonly known as:  Ray Kugel Take 1 Tab by mouth daily. FISH OIL 1,000 mg Cap Generic drug:  omega-3 fatty acids-vitamin e Take 1 Cap by mouth. * glucose blood VI test strips strip Commonly known as:  Ascensia CONTOUR  
by Does Not Apply route daily. * glucose blood VI test strips strip Commonly known as:  ONETOUCH ULTRA TEST Once daily ICAPS AREDS PO Take  by mouth. ipratropium 0.06 % nasal spray Commonly known as:  ATROVENT INSTILL 2 SPRAYS INTO EACH NOSTRIL 4 TIMES A DAY  
  
 multivitamin tablet Commonly known as:  ONE A DAY Take 1 Tab by mouth daily. * MYRBETRIQ 50 mg ER tablet Generic drug:  mirabegron ER Take  by mouth. * MYRBETRIQ 25 mg ER tablet Generic drug:  mirabegron ER  
 Take 25 mg by mouth daily. pentoxifylline  mg CR tablet Commonly known as:  TRENTAL Take 400 mg by mouth three (3) times daily (with meals). potassium chloride 20 mEq tablet Commonly known as:  K-DUR, KLOR-CON  
take 1 tablet by mouth twice a day RESTASIS 0.05 % ophthalmic emulsion Generic drug:  cycloSPORINE Administer 1 Drop to both eyes two (2) times a day. simvastatin 20 mg tablet Commonly known as:  ZOCOR  
take 1 tablet by mouth at bedtime * Notice: This list has 4 medication(s) that are the same as other medications prescribed for you. Read the directions carefully, and ask your doctor or other care provider to review them with you. Follow-up Instructions Return in about 6 months (around 2/15/2018) for hypertension. To-Do List   
 Around 08/15/2017 Imaging:  XR THUMB RT MIN 2 V Patient Instructions Arthritis: Care Instructions Your Care Instructions Arthritis, also called osteoarthritis, is a breakdown of the cartilage that cushions your joints. When the cartilage wears down, your bones rub against each other. This causes pain and stiffness. Many people have some arthritis as they age. Arthritis most often affects the joints of the spine, hands, hips, knees, or feet. You can take simple measures to protect your joints, ease your pain, and help you stay active. Follow-up care is a key part of your treatment and safety. Be sure to make and go to all appointments, and call your doctor if you are having problems. It's also a good idea to know your test results and keep a list of the medicines you take. How can you care for yourself at home? · Stay at a healthy weight. Being overweight puts extra strain on your joints. · Talk to your doctor or physical therapist about exercises that will help ease joint pain. ¨ Stretch. You may enjoy gentle forms of yoga to help keep your joints and muscles flexible. ¨ Walk instead of jog. Other types of exercise that are less stressful on the joints include riding a bicycle, swimming, melissa chi, or water exercise. ¨ Lift weights. Strong muscles help reduce stress on your joints. Stronger thigh muscles, for example, take some of the stress off of the knees and hips. Learn the right way to lift weights so you do not make joint pain worse. · Take your medicines exactly as prescribed. Call your doctor if you think you are having a problem with your medicine. · Take pain medicines exactly as directed. ¨ If the doctor gave you a prescription medicine for pain, take it as prescribed. ¨ If you are not taking a prescription pain medicine, ask your doctor if you can take an over-the-counter medicine. · Use a cane, crutch, walker, or another device if you need help to get around. These can help rest your joints. You also can use other things to make life easier, such as a higher toilet seat and padded handles on kitchen utensils. · Do not sit in low chairs, which can make it hard to get up. · Put heat or cold on your sore joints as needed. Use whichever helps you most. You also can take turns with hot and cold packs. ¨ Apply heat 2 or 3 times a day for 20 to 30 minutesusing a heating pad, hot shower, or hot packto relieve pain and stiffness. ¨ Put ice or a cold pack on your sore joint for 10 to 20 minutes at a time. Put a thin cloth between the ice and your skin. When should you call for help? Call your doctor now or seek immediate medical care if: 
· You have sudden swelling, warmth, or pain in any joint. · You have joint pain and a fever or rash. · You have such bad pain that you cannot use a joint. Watch closely for changes in your health, and be sure to contact your doctor if: 
· You have mild joint symptoms that continue even with more than 6 weeks of care at home. · You have stomach pain or other problems with your medicine. Where can you learn more? Go to http://alphonso-ami.info/. Enter S782 in the search box to learn more about \"Arthritis: Care Instructions. \" Current as of: November 28, 2016 Content Version: 11.3 © 2560-0856 [employer]. Care instructions adapted under license by [employer] (which disclaims liability or warranty for this information). If you have questions about a medical condition or this instruction, always ask your healthcare professional. [place] [address] any warranty or liability for your use of this information. Introducing [address]! Nguyen Chandler introduces linkedÃ¼ patient portal. Now you can access parts of your medical record, email your doctor's office, and request medication refills online. 1. In your internet browser, go to [email or web address] 2. Click on the First Time User? Click Here link in the Sign In box. You will see the New Member Sign Up page. 3. Enter your linkedÃ¼ Access Code exactly as it appears below. You will not need to use this code after youve completed the sign-up process. If you do not sign up before the expiration date, you must request a new code. · linkedÃ¼ Access Code: [email or web address] Expires: 11/13/2017 11:57 AM 
 
4. Enter the last four digits of your Social Security Number (xxxx) and Date of Birth (mm/dd/yyyy) as indicated and click Submit. You will be taken to the next sign-up page. 5. Create a linkedÃ¼ ID. This will be your linkedÃ¼ login ID and cannot be changed, so think of one that is secure and easy to remember. 6. Create a linkedÃ¼ password. You can change your password at any time. 7. Enter your Password Reset Question and Answer. This can be used at a later time if you forget your password. 8. Enter your e-mail address. You will receive e-mail notification when new information is available in [address] 9. Click Sign Up. You can now view and download portions of your medical record. 10. Click the Download Summary menu link to download a portable copy of your medical information. If you have questions, please visit the Frequently Asked Questions section of the [employer] website. Remember, [employer] is NOT to be used for urgent needs. For medical emergencies, dial 911. Now available from your iPhone and Android! Please provide this summary of care documentation to your next provider. Your primary care clinician is listed as Alma Eisenberg. If you have any questions after today's visit, please call [phone].

## 2017-08-17 ENCOUNTER — TELEPHONE (OUTPATIENT)
Dept: FAMILY MEDICINE CLINIC | Age: 82
End: 2017-08-17

## 2017-08-17 NOTE — TELEPHONE ENCOUNTER
----- Message from Az Stacy MD sent at 8/16/2017  7:18 AM EDT -----  Please let her know her labs are stable and the xray of her thumb was consistent with arthritis.

## 2017-08-17 NOTE — TELEPHONE ENCOUNTER
Spoke with patient, verified with 2 identifiers. Advised that labs came back stable and that xray showed arthritis in thumb. Patient verbalized understanding.

## 2017-08-25 RX ORDER — POTASSIUM CHLORIDE 20 MEQ/1
TABLET, EXTENDED RELEASE ORAL
Qty: 120 TAB | Refills: 5 | Status: SHIPPED | OUTPATIENT
Start: 2017-08-25 | End: 2018-04-19 | Stop reason: SDUPTHER

## 2017-08-25 NOTE — TELEPHONE ENCOUNTER
Requested Prescriptions     Pending Prescriptions Disp Refills    potassium chloride (K-DUR, KLOR-CON) 20 mEq tablet 120 Tab 5     Sig: take 1 tablet by mouth twice a day

## 2017-10-27 RX ORDER — MIRABEGRON 25 MG/1
TABLET, FILM COATED, EXTENDED RELEASE ORAL
Qty: 30 TAB | Refills: 0 | Status: SHIPPED | OUTPATIENT
Start: 2017-10-27 | End: 2018-01-02 | Stop reason: SDUPTHER

## 2018-01-02 RX ORDER — MIRABEGRON 25 MG/1
TABLET, FILM COATED, EXTENDED RELEASE ORAL
Qty: 30 TAB | Refills: 0 | Status: SHIPPED | OUTPATIENT
Start: 2018-01-02 | End: 2018-02-01 | Stop reason: SDUPTHER

## 2018-02-01 RX ORDER — MIRABEGRON 25 MG/1
TABLET, FILM COATED, EXTENDED RELEASE ORAL
Qty: 90 TAB | Refills: 3 | Status: SHIPPED | OUTPATIENT
Start: 2018-02-01 | End: 2018-03-06 | Stop reason: SDUPTHER

## 2018-02-01 NOTE — TELEPHONE ENCOUNTER
I refilled the 25mg dose as requested, but please ask if the 50 mg dose was tolerated and more effective. If so, I can change the rx.

## 2018-02-15 ENCOUNTER — OFFICE VISIT (OUTPATIENT)
Dept: INTERNAL MEDICINE CLINIC | Age: 83
End: 2018-02-15

## 2018-02-15 VITALS
WEIGHT: 162 LBS | DIASTOLIC BLOOD PRESSURE: 68 MMHG | OXYGEN SATURATION: 99 % | TEMPERATURE: 97 F | HEART RATE: 89 BPM | HEIGHT: 65 IN | RESPIRATION RATE: 16 BRPM | BODY MASS INDEX: 26.99 KG/M2 | SYSTOLIC BLOOD PRESSURE: 140 MMHG

## 2018-02-15 DIAGNOSIS — I10 ESSENTIAL HYPERTENSION: ICD-10-CM

## 2018-02-15 DIAGNOSIS — Z00.00 MEDICARE ANNUAL WELLNESS VISIT, SUBSEQUENT: Primary | ICD-10-CM

## 2018-02-15 DIAGNOSIS — N39.46 MIXED STRESS AND URGE URINARY INCONTINENCE: ICD-10-CM

## 2018-02-15 RX ORDER — PETROLATUM 42 G/100G
OINTMENT TOPICAL AS NEEDED
COMMUNITY

## 2018-02-15 NOTE — MR AVS SNAPSHOT
[facility] 
 
 
 [address] [facility] [address] 
[phone] Patient: Vinicius Larsen MRN: [id number] Visit Information Date & Time Provider Department Dept. Phone Encounter #  
 2/15/2018 10:30 AM [facility] [phone] [id number] Follow-up Instructions Return in about 4 months (around 6/15/2018), or if symptoms worsen or fail to improve. Upcoming Health Maintenance Date Due  
 EYE EXAM RETINAL OR DILATED Q1 11/10/2017 FOOT EXAM Q1 2/15/2018 HEMOGLOBIN A1C Q6M 2/15/2018 MICROALBUMIN Q1 2/15/2018 MEDICARE YEARLY EXAM 2/16/2018 LIPID PANEL Q1 8/15/2018 GLAUCOMA SCREENING Q2Y 11/10/2018 DTaP/Tdap/Td series (2 - Td) 4/12/2022 Allergies as of 2/15/2018  Review Complete On: 2/15/2018 By: Maggi Walls Severity Noted Reaction Type Reactions Sulfa (Sulfonamide Antibiotics)  02/05/2015    Swelling Swelling in mouth Current Immunizations  Reviewed on 10/22/2014 Name Date Influenza High Dose Vaccine PF 10/16/2017, 10/14/2016 Influenza Vaccine 10/17/2014 Pneumococcal Conjugate (PCV-13) 10/14/2016, 1/13/2016 Pneumococcal Polysaccharide (PPSV-23) 5/5/2015 Tdap 4/12/2012 12:00 AM  
 Zoster Vaccine, Live 1/1/2014 Not reviewed this visit You Were Diagnosed With   
  
 Codes Comments Medicare annual wellness visit, subsequent    -  Primary ICD-10-CM: Z00.00 ICD-9-CM: V70.0 Essential hypertension     ICD-10-CM: I10 
ICD-9-CM: 401.9 Mixed stress and urge urinary incontinence     ICD-10-CM: N39.46 
ICD-9-CM: 788.33 Vitals BP Pulse Temp Resp Height(growth percentile) Weight(growth percentile) 140/68 (BP 1 Location: Left arm, BP Patient Position: Sitting) 89 97 °F (36.1 °C) (Oral) 16 5' 5\" (1.651 m) 162 lb (73.5 kg) SpO2 BMI OB Status Smoking Status 99% 26.96 kg/m2 Postmenopausal Never Smoker Vitals History BMI and BSA Data Body Mass Index Body Surface Area  
 26.96 kg/m 2 1.84 m 2 Preferred Pharmacy Pharmacy Name Phone [facility], [address] [place] [address] [address] [facility] [phone] Your Updated Medication List  
  
   
This list is accurate as of: 2/15/18 11:17 AM.  Always use your most recent med list.  
  
  
  
  
 acetaminophen 500 mg tablet Commonly known as:  TYLENOL Take 1 Tab by mouth every six (6) hours as needed for Pain. AQUAPHOR ointment Generic drug:  mineral oil-hydrophil petrolat Apply  to affected area as needed for Dry Skin. aspirin 81 mg tablet Take 81 mg by mouth. atenolol-chlorthalidone 50-25 mg per tablet Commonly known as:  TENORETIC  
take 1 tablet by mouth once daily Blood-Glucose Meter monitoring kit Commonly known as:  CONTOUR METER  
by Does Not Apply route. Monitor daily  
  
 calcium 500 mg Tab Take  by mouth. cholecalciferol (VITAMIN D3) 5,000 unit Tab tablet Commonly known as:  VITAMIN D3 Take  by mouth daily. CLARITIN 10 mg tablet Generic drug:  loratadine Take 10 mg by mouth. diclofenac 1 % Gel Commonly known as:  VOLTAREN Apply  to affected area four (4) times daily. fexofenadine 180 mg tablet Commonly known as:  Veleria Call Take 1 Tab by mouth daily. FISH OIL 1,000 mg Cap Generic drug:  omega-3 fatty acids-vitamin e Take 1 Cap by mouth. * glucose blood VI test strips strip Commonly known as:  Ascensia CONTOUR  
by Does Not Apply route daily. * glucose blood VI test strips strip Commonly known as:  ONETOUCH ULTRA TEST Once daily ICAPS AREDS PO Take  by mouth. ipratropium 42 mcg (0.06 %) nasal spray Commonly known as:  ATROVENT INSTILL 2 SPRAYS INTO EACH NOSTRIL 4 TIMES A DAY  
  
 multivitamin tablet Commonly known as:  ONE A DAY Take 1 Tab by mouth daily. * MYRBETRIQ 50 mg ER tablet Generic drug:  mirabegron ER Take  by mouth. * MYRBETRIQ 25 mg ER tablet Generic drug:  mirabegron ER  
TAKE 1 TABLET BY MOUTH ONCE A DAY  
  
 OTHER  
OTC Blue-EMU super strength topical cream  
  
 pentoxifylline  mg CR tablet Commonly known as:  TRENTAL Take 400 mg by mouth three (3) times daily (with meals). potassium chloride 20 mEq tablet Commonly known as:  K-DUR, KLOR-CON  
take 1 tablet by mouth twice a day RESTASIS 0.05 % ophthalmic emulsion Generic drug:  cycloSPORINE Administer 1 Drop to both eyes two (2) times a day. simvastatin 20 mg tablet Commonly known as:  ZOCOR  
take 1 tablet by mouth at bedtime VISION-RILEY PRESERVE PO Take  by mouth. * Notice: This list has 4 medication(s) that are the same as other medications prescribed for you. Read the directions carefully, and ask your doctor or other care provider to review them with you. Follow-up Instructions Return in about 4 months (around 6/15/2018), or if symptoms worsen or fail to improve. Patient Instructions Medicare Wellness Visit, Female The best way to live healthy is to have a healthy lifestyle by eating a well-balanced diet, exercising regularly, limiting alcohol and stopping smoking. Regular physical exams and screening tests are another way to keep healthy. Preventive exams provided by your health care provider can find health problems before they become diseases or illnesses. Preventive services including immunizations, screening tests, monitoring and exams can help you take care of your own health. All people over age 72 should have a pneumovax  and and a prevnar shot to prevent pneumonia. These are once in a lifetime unless you and your provider decide differently. All people over 65 should have a yearly flu shot and a tetanus vaccine every 10 years. A bone mass density to screen for osteoporosis or thinning of the bones should be done every 2 years after 65. Screening for diabetes mellitus with a blood sugar test should be done every year. Glaucoma is a disease of the eye due to increased ocular pressure that can lead to blindness and it should be done every year by an eye professional. 
 
Cardiovascular screening tests that check for elevated lipids (fatty part of blood) which can lead to heart disease and strokes should be done every 5 years. Colorectal screening that evaluates for blood or polyps in your colon should be done yearly as a stool test or every five years as a flexible sigmoidoscope or every 10 years as a colonoscopy up to age 76. Breast cancer screening with a mammogram is recommended biennially  for women age 54-69. Screening for cervical cancer with a pap smear and pelvic exam is recommended for women after age 72 years every 2 years up to age 79 or when the provider and patient decide to stop. If there is a history of cervical abnormalities or other increased risk for cancer then the test is recommended yearly. Hepatitis C screening is also recommended for anyone born between 80 through [address]. A shingles vaccine is also recommended once in a lifetime after age 61. Your Medicare Wellness Exam is recommended annually. Here is a list of your current Health Maintenance items with a due date: 
Health Maintenance Due Topic Date Due An[facility] Eye Exam  11/10/2017 Ange Solis Diabetic Foot Care  02/15/2018  Hemoglobin A1C    02/15/2018  Albumin Urine Test  02/15/2018 Bladder Training: Care Instructions Your Care Instructions Bladder training is used to treat urge incontinence and stress incontinence. Urge incontinence means that the need to urinate comes on so fast that you can't get to a toilet in time. Stress incontinence means that you leak urine because of pressure on your bladder.  For example, it may happen when you laugh, cough, or lift something heavy. Bladder training can increase how long you can wait before you have to urinate. It can also help your bladder hold more urine. And it can give you better control over the urge to urinate. It is important to remember that bladder training takes a few weeks to a few months to make a difference. You may not see results right away, but don't give up. Follow-up care is a key part of your treatment and safety. Be sure to make and go to all appointments, and call your doctor if you are having problems. It's also a good idea to know your test results and keep a list of the medicines you take. How can you care for yourself at home? Work with your doctor to come up with a bladder training program that is right for you. You may use one or more of the following methods. Delayed urination · In the beginning, try to keep from urinating for 5 minutes after you first feel the need to go. · While you wait, take deep, slow breaths to relax. Kegel exercises can also help you delay the need to go to the bathroom. · After some practice, when you can easily wait 5 minutes to urinate, try to wait 10 minutes before you urinate. · Slowly increase the waiting period until you are able to control when you have to urinate. Scheduled urination · Empty your bladder when you first wake up in the morning. · Schedule times throughout the day when you will urinate. · Start by going to the bathroom every hour, even if you don't need to go. · Slowly increase the time between trips to the bathroom. · When you have found a schedule that works well for you, keep doing it. · If you wake up during the night and have to urinate, do it. Apply your schedule to waking hours only. Kegel exercises These tighten and strengthen pelvic muscles, which can help you control the flow of urine. To do Kegel exercises: 
· Squeeze the same muscles you would use to stop your urine.  Your belly and thighs should not move. · Hold the squeeze for 3 seconds, and then relax for 3 seconds. · Start with 3 seconds. Then add 1 second each week until you are able to squeeze for 10 seconds. · Repeat the exercise 10 to 15 times a session. Do three or more sessions a day. When should you call for help? Watch closely for changes in your health, and be sure to contact your doctor if: 
? · Your incontinence is getting worse. ? · You do not get better as expected. Where can you learn more? Go to http://alphonso-ami.info/. Enter N741 in the search box to learn more about \"Bladder Training: Care Instructions. \" Current as of: May 12, 2017 Content Version: 11.4 © 1974-1003 [employer]. Care instructions adapted under license by [employer] (which disclaims liability or warranty for this information). If you have questions about a medical condition or this instruction, always ask your healthcare professional. [place] [address] any warranty or liability for your use of this information. Introducing [address]! Iggy Hunter introduces [employer] patient portal. Now you can access parts of your medical record, email your doctor's office, and request medication refills online. 1. In your internet browser, go to [email or web address] 2. Click on the First Time User? Click Here link in the Sign In box. You will see the New Member Sign Up page. 3. Enter your [employer] Access Code exactly as it appears below. You will not need to use this code after youve completed the sign-up process. If you do not sign up before the expiration date, you must request a new code. · [employer] Access Code: [email or web address] Expires: 5/16/2018 11:17 AM 
 
4. Enter the last four digits of your Social Security Number (xxxx) and Date of Birth (mm/dd/yyyy) as indicated and click Submit. You will be taken to the next sign-up page. 5. Create a [employer] ID. This will be your [employer] login ID and cannot be changed, so think of one that is secure and easy to remember. 6. Create a [employer] password. You can change your password at any time. 7. Enter your Password Reset Question and Answer. This can be used at a later time if you forget your password. 8. Enter your e-mail address. You will receive e-mail notification when new information is available in [address] 9. Click Sign Up. You can now view and download portions of your medical record. 10. Click the Download Summary menu link to download a portable copy of your medical information. If you have questions, please visit the Frequently Asked Questions section of the [employer] website. Remember, [employer] is NOT to be used for urgent needs. For medical emergencies, dial 911. Now available from your iPhone and Android! Please provide this summary of care documentation to your next provider. Your primary care clinician is listed as Alma Eisenberg. If you have any questions after today's visit, please call [phone].

## 2018-02-15 NOTE — PROGRESS NOTES
ROOM # [id number]    La Perez presents today for   Chief Complaint   Patient presents with   [facility] Annual Wellness Visit         Cholesterol Problem    Diabetes    Hypertension       La Perez preferred language for health care discussion is english/other. Is someone accompanying this pt? no    Is the patient using any DME equipment during [address]? Yes, walking cane    Depression Screening:  PHQ over the last two weeks 2/15/2018 8/15/2017 2/15/2017 11/16/2016 8/16/2016 1/13/2016 2/5/2015   Little interest or pleasure in doing things Not at all Not at all Not at all Not at all Not at all Not at all Not at all   Feeling down, depressed or hopeless Not at all Not at all Not at all Not at all Several days Not at all Not at all   Total Score PHQ 2 0 0 0 0 1 0 0       Learning Assessment:  Learning Assessment 7/15/2014   PRIMARY LEARNER Patient   HIGHEST LEVEL OF EDUCATION - PRIMARY LEARNER  > 4 YEARS OF COLLEGE   BARRIERS PRIMARY LEARNER NONE   PRIMARY LANGUAGE ENGLISH   LEARNER PREFERENCE PRIMARY DEMONSTRATION   ANSWERED BY patient   RELATIONSHIP SELF       Abuse Screening:  Abuse Screening Questionnaire 2/15/2017 1/13/2016 5/5/2015   Do you ever feel afraid of your partner? N N N   Are you in a relationship with someone who physically or mentally threatens you? N N N   Is it safe for you to go home? Alejandro Holden       Fall Risk  Fall Risk Assessment, last 12 mths 2/15/2018 8/15/2017 2/15/2017 11/16/2016 9/12/2016 8/16/2016 1/13/2016   Able to walk? Yes Yes Yes Yes Yes Yes Yes   Fall in past 12 months? No No No No No No No       Health Maintenance reviewed and discussed per provider. Yes    aL Perez is due for eye exam (getting release), foot (see's podiatry getting release), a1c, microalbumin (pend). Please order/place referral if appropriate. Pt currently taking Antiplatelet therapy? Yes aspirin    Advance Directive:  1. Do you have an advance directive in place? Patient Reply: no    2.  If not, would you like material regarding how to put one in place? Patient Reply: no    Coordination of Care:  1. Have you been to the ER, urgent care clinic since your last visit? Hospitalized since your last visit? no    2. Have you seen or consulted any other health care providers outside of the [address] [place] [address] since your last visit? Include any pap smears or colon screening.  Yes, Optham

## 2018-02-15 NOTE — PATIENT INSTRUCTIONS
Medicare Wellness Visit, Female    The best way to live healthy is to have a healthy lifestyle by eating a well-balanced diet, exercising regularly, limiting alcohol and stopping smoking. Regular physical exams and screening tests are another way to keep healthy. Preventive exams provided by your health care provider can find health problems before they become diseases or illnesses. Preventive services including immunizations, screening tests, monitoring and exams can help you take care of your own health. All people over age 72 should have a pneumovax  and and a prevnar shot to prevent pneumonia. These are once in a lifetime unless you and your provider decide differently. All people over 65 should have a yearly flu shot and a tetanus vaccine every 10 years. A bone mass density to screen for osteoporosis or thinning of the bones should be done every 2 years after 65. Screening for diabetes mellitus with a blood sugar test should be done every year. Glaucoma is a disease of the eye due to increased ocular pressure that can lead to blindness and it should be done every year by an eye professional.    Cardiovascular screening tests that check for elevated lipids (fatty part of blood) which can lead to heart disease and strokes should be done every 5 years. Colorectal screening that evaluates for blood or polyps in your colon should be done yearly as a stool test or every five years as a flexible sigmoidoscope or every 10 years as a colonoscopy up to age 76. Breast cancer screening with a mammogram is recommended biennially  for women age 54-69. Screening for cervical cancer with a pap smear and pelvic exam is recommended for women after age 72 years every 2 years up to age 79 or when the provider and patient decide to stop. If there is a history of cervical abnormalities or other increased risk for cancer then the test is recommended yearly.     Hepatitis C screening is also recommended for anyone born between [birth year] through [address]. A shingles vaccine is also recommended once in a lifetime after age 61. Your Medicare Wellness Exam is recommended annually. Here is a list of your current Health Maintenance items with a due date:  Health Maintenance Due   Topic Date Due    Eye Exam  11/10/2017    Diabetic Foot Care  02/15/2018    Hemoglobin A1C    02/15/2018    Albumin Urine Test  02/15/2018          Bladder Training: Care Instructions  Your Care Instructions    Bladder training is used to treat urge incontinence and stress incontinence. Urge incontinence means that the need to urinate comes on so fast that you can't get to a toilet in time. Stress incontinence means that you leak urine because of pressure on your bladder. For example, it may happen when you laugh, cough, or lift something heavy. Bladder training can increase how long you can wait before you have to urinate. It can also help your bladder hold more urine. And it can give you better control over the urge to urinate. It is important to remember that bladder training takes a few weeks to a few months to make a difference. You may not see results right away, but don't give up. Follow-up care is a key part of your treatment and safety. Be sure to make and go to all appointments, and call your doctor if you are having problems. It's also a good idea to know your test results and keep a list of the medicines you take. How can you care for yourself at home? Work with your doctor to come up with a bladder training program that is right for you. You may use one or more of the following methods. Delayed urination  · In the beginning, try to keep from urinating for 5 minutes after you first feel the need to go. · While you wait, take deep, slow breaths to relax. Kegel exercises can also help you delay the need to go to the bathroom.   · After some practice, when you can easily wait 5 minutes to urinate, try to wait 10 minutes before you urinate. · Slowly increase the waiting period until you are able to control when you have to urinate. Scheduled urination  · Empty your bladder when you first wake up in the morning. · Schedule times throughout the day when you will urinate. · Start by going to the bathroom every hour, even if you don't need to go. · Slowly increase the time between trips to the bathroom. · When you have found a schedule that works well for you, keep doing it. · If you wake up during the night and have to urinate, do it. Apply your schedule to waking hours only. Kegel exercises  These tighten and strengthen pelvic muscles, which can help you control the flow of urine. To do Kegel exercises:  · Squeeze the same muscles you would use to stop your urine. Your belly and thighs should not move. · Hold the squeeze for 3 seconds, and then relax for 3 seconds. · Start with 3 seconds. Then add 1 second each week until you are able to squeeze for 10 seconds. · Repeat the exercise 10 to 15 times a session. Do three or more sessions a day. When should you call for help? Watch closely for changes in your health, and be sure to contact your doctor if:  ? · Your incontinence is getting worse. ? · You do not get better as expected. Where can you learn more? Go to http://alphonso-ami.info/. Enter O202 in the search box to learn more about \"Bladder Training: Care Instructions. \"  Current as of: May 12, 2017  Content Version: 11.4  © 0738-0549 [employer]. Care instructions adapted under license by [employer] (which disclaims liability or warranty for this information). If you have questions about a medical condition or this instruction, always ask your healthcare professional. [address] any warranty or liability for your use of this information.

## 2018-02-15 NOTE — ACP (ADVANCE CARE PLANNING)
Advance Care Planning (ACP) Provider Conversation Snapshot    Date of ACP Conversation: 02/15/18  Persons included in Conversation:  patient  Length of ACP Conversation in minutes:  <16 minutes (Non-Billable)    Authorized Decision Maker (if patient is incapable of making informed decisions): This person is:   Healthcare Agent/Medical Power of  under Advance Directive          For Patients with Decision Making Capacity:   Values/Goals: Exploration of values, goals, and preferences if recovery is not expected, even with continued medical treatment in the event of:  Imminent death  Severe, permanent brain injury    Conversation Outcomes / Follow-Up Plan:   Recommended communicating the plan and making copies for the healthcare agent, personal physician, and others as appropriate (e.g., health system)  Son would be her surrogate decision maker. Would not wish to be maintained on mechanical ventilation.

## 2018-02-15 NOTE — PROGRESS NOTES
This is a Subsequent Medicare Annual Wellness Exam (AWV) (Performed 12 months after IPPE or effective date of Medicare Part B enrollment)    I have reviewed the patient's medical history in detail and updated the computerized patient record. History   79 yo female here for [address] Independent in [place]ADLs. Lives alone. Past Medical History:   Diagnosis Date    ASVD (arteriosclerotic vascular disease)     Breast cancer ([facility] [address]     Left breast Cancer 1996    Diabetes ([facility] [address]     Hypercholesterolemia     Hypertension     Radiation therapy     Post Left breast Cancer 1996    Radiation therapy complication 9820    Lt breast      Past Surgical History:   Procedure Laterality Date    BREAST SURGERY PROCEDURE UNLISTED      Post Left breast cancer 1996    HX BREAST BIOPSY Right ? Scar on right breast pt does not remember from what    HX BREAST LUMPECTOMY Left 1996    breast cancer    HX CATARACT REMOVAL      HX ORTHOPAEDIC       Current Outpatient Prescriptions   Medication Sig Dispense Refill    mineral oil-hydrophil petrolat (AQUAPHOR) ointment Apply  to affected area as needed for Dry Skin.  VITS A,C,E/ZINC/COPPER (VISION-RILEY PRESERVE PO) Take  by mouth.  OTHER OTC Blue-EMU super strength topical cream      MYRBETRIQ 25 mg ER tablet TAKE 1 TABLET BY MOUTH ONCE A DAY 90 Tab 3    atenolol-chlorthalidone (TENORETIC) 50-25 mg per tablet take 1 tablet by mouth once daily 90 Tab 3    potassium chloride (K-DUR, KLOR-CON) 20 mEq tablet take 1 tablet by mouth twice a day 120 Tab 5    VIT A/VIT C/VIT E/ZINC/COPPER (ICAPS AREDS PO) Take  by mouth.  loratadine (CLARITIN) 10 mg tablet Take 10 mg by mouth.  simvastatin (ZOCOR) 20 mg tablet take 1 tablet by mouth at bedtime 60 Tab 5    acetaminophen (TYLENOL) 500 mg tablet Take 1 Tab by mouth every six (6) hours as needed for Pain. 30 Tab 0    diclofenac (VOLTAREN) 1 % gel Apply  to affected area four (4) times daily.  2 g 0    ipratropium (ATROVENT) 0.06 % nasal spray INSTILL 2 SPRAYS INTO EACH NOSTRIL 4 TIMES A DAY 15 mL 5    fexofenadine (ALLEGRA) 180 mg tablet Take 1 Tab by mouth daily. 60 Tab 3    pentoxifylline CR (TRENTAL) 400 mg CR tablet Take 400 mg by mouth three (3) times daily (with meals).  cholecalciferol, VITAMIN D3, (VITAMIN D3) 5,000 unit tab tablet Take  by mouth daily.  mirabegron (MYRBETRIQ) 50 mg Tb24 Take  by mouth.  cycloSPORINE (RESTASIS) 0.05 % ophthalmic emulsion Administer 1 Drop to both eyes two (2) times a day.  glucose blood VI test strips (ONE TOUCH ULTRA TEST) strip Once daily 1 Package 5    glucose blood VI test strips (ASCENSIA CONTOUR) strip by Does Not Apply route daily. 1 Package 11    omega-3 fatty acids-vitamin e (FISH OIL) 1,000 mg Cap Take 1 Cap by mouth.  calcium 500 mg Tab Take  by mouth.  aspirin 81 mg tablet Take 81 mg by mouth.  multivitamin (ONE A DAY) tablet Take 1 Tab by mouth daily.  Blood-Glucose Meter (CONTOUR METER) monitoring kit by Does Not Apply route. Monitor daily 1 Kit 0     Allergies   Allergen Reactions    Sulfa (Sulfonamide Antibiotics) Swelling     Swelling in mouth     History reviewed. No pertinent family history. Social History   Substance Use Topics    Smoking status: Never Smoker    Smokeless tobacco: Never Used    Alcohol use No     Patient Active Problem List   Diagnosis Code    Hypertension I10    Diabetes mellitus ([facility] [address] E11.9    Anxiety F41.9    Hypercholesterolemia E78.00    Rhinitis due food J30.5       Depression Risk Factor Screening:     PHQ over the last two weeks 2/15/2018   Little interest or pleasure in doing things Not at all   Feeling down, depressed or hopeless Not at all   Total Score PHQ 2 0     Alcohol Risk Factor Screening: You do not drink alcohol or very rarely. Functional Ability and Level of Safety:   Hearing Loss  Hearing is good.     Activities of Daily Living  The home contains: no safety equipment. Patient does total self care    Fall Risk  Fall Risk Assessment, last 12 mths 2/15/2018   Able to walk? Yes   Fall in past 12 months? No       Abuse Screen  Patient is not abused    Cognitive Screening   Evaluation of Cognitive Function:  Has your family/caregiver stated any concerns about your memory: no  Normal    Patient Care Team   Patient Care Team:  Humberto Spicer MD as PCP - General (Internal Medicine)  Vilma Young RN as Nurse Navigator  Bonnie Mack MD (Ophthalmology)  Kathleen Coughlin DPM (Podiatry)  Humble Portillo MD (Obstetrics & Gynecology)  Aidee Wilson MD (Ophthalmology)    Assessment/Plan   Education and counseling provided:  Are appropriate based on today's review and evaluation  End-of-Life planning (with patient's consent)    Diagnoses and all orders for this visit:    1. Medicare annual wellness visit, subsequent    Other orders  -     HEMOGLOBIN A1C W/O EAG; Future  -     LIPID PANEL; Future  -     METABOLIC PANEL, COMPREHENSIVE; Future      Health Maintenance Due   Topic Date Due    EYE EXAM RETINAL OR DILATED Q1  11/10/2017    FOOT EXAM Q1  02/15/2018    HEMOGLOBIN A1C Q6M  02/15/2018    MICROALBUMIN Q1  02/15/2018     Discussed ACP and provided paperwork on this.

## 2018-02-15 NOTE — PROGRESS NOTES
HISTORY OF PRESENT ILLNESS  La Rivera is a 80 y.o. female. HPI Comments: 81 yo female here for [address], f/u of HTN, UI  HTN BP stable. No Cp, SOB. UI - not sig better with medication. Wears pads which she needs to change 2-3 times daily. Describes urge. Leakage of small amt of urine. No dysuria, fevers chills. Thinks myrbetriq may help a little. Sx worse at night. Has reduced evening fluid intake. Notes some ankle swelling that improves in the morning. Cholesterol Problem   Pertinent negatives include no chest pain, no headaches and no shortness of breath. Diabetes   Pertinent negatives include no chest pain, no headaches and no shortness of breath. Hypertension    Pertinent negatives include no chest pain, no palpitations, no blurred vision, no headaches, no dizziness, no shortness of breath, no nausea and no vomiting. Review of Systems   Constitutional: Negative for chills, fever and weight loss. HENT: Negative for congestion and ear pain. Eyes: Negative for blurred vision and pain. Respiratory: Negative for cough and shortness of breath. Cardiovascular: Negative for chest pain, palpitations and leg swelling. Gastrointestinal: Negative for nausea and vomiting. Genitourinary: Positive for frequency and urgency. Negative for dysuria, flank pain and hematuria. Musculoskeletal: Negative for joint pain and myalgias. Skin: Negative for itching and rash. Neurological: Negative for dizziness, tingling and headaches. Psychiatric/Behavioral: Negative for depression. The patient is not nervous/anxious.       Past Medical History:   Diagnosis Date    ASVD (arteriosclerotic vascular disease)     Breast cancer ([facility] [address]     Left breast Cancer 1996    Diabetes ([facility] [address]     Hypercholesterolemia     Hypertension     Radiation therapy     Post Left breast Cancer 1996    Radiation therapy complication 9060    Lt breast     Current Outpatient Prescriptions on File Prior to Visit   Medication Sig Dispense Refill    MYRBETRIQ 25 mg ER tablet TAKE 1 TABLET BY MOUTH ONCE A DAY 90 Tab 3    atenolol-chlorthalidone (TENORETIC) 50-25 mg per tablet take 1 tablet by mouth once daily 90 Tab 3    potassium chloride (K-DUR, KLOR-CON) 20 mEq tablet take 1 tablet by mouth twice a day 120 Tab 5    VIT A/VIT C/VIT E/ZINC/COPPER (ICAPS AREDS PO) Take  by mouth.  loratadine (CLARITIN) 10 mg tablet Take 10 mg by mouth.  simvastatin (ZOCOR) 20 mg tablet take 1 tablet by mouth at bedtime 60 Tab 5    acetaminophen (TYLENOL) 500 mg tablet Take 1 Tab by mouth every six (6) hours as needed for Pain. 30 Tab 0    diclofenac (VOLTAREN) 1 % gel Apply  to affected area four (4) times daily. 2 g 0    ipratropium (ATROVENT) 0.06 % nasal spray INSTILL 2 SPRAYS INTO EACH NOSTRIL 4 TIMES A DAY 15 mL 5    fexofenadine (ALLEGRA) 180 mg tablet Take 1 Tab by mouth daily. 60 Tab 3    pentoxifylline CR (TRENTAL) 400 mg CR tablet Take 400 mg by mouth three (3) times daily (with meals).  cholecalciferol, VITAMIN D3, (VITAMIN D3) 5,000 unit tab tablet Take  by mouth daily.  mirabegron (MYRBETRIQ) 50 mg Tb24 Take  by mouth.  cycloSPORINE (RESTASIS) 0.05 % ophthalmic emulsion Administer 1 Drop to both eyes two (2) times a day.  glucose blood VI test strips (ONE TOUCH ULTRA TEST) strip Once daily 1 Package 5    glucose blood VI test strips (ASCENSIA CONTOUR) strip by Does Not Apply route daily. 1 Package 11    omega-3 fatty acids-vitamin e (FISH OIL) 1,000 mg Cap Take 1 Cap by mouth.  calcium 500 mg Tab Take  by mouth.  aspirin 81 mg tablet Take 81 mg by mouth.  multivitamin (ONE A DAY) tablet Take 1 Tab by mouth daily.  Blood-Glucose Meter (CONTOUR METER) monitoring kit by Does Not Apply route. Monitor daily 1 Kit 0     No current facility-administered medications on file prior to visit.       Social History   Substance Use Topics    Smoking status: Never Smoker    Smokeless tobacco: Never Used    Alcohol use No     Physical Exam   Constitutional: She appears well-developed and well-nourished. No distress. /68 (BP 1 Location: Left arm, BP Patient Position: Sitting)  Pulse 89  Temp 97 °F (36.1 °C) (Oral)   Resp 16  Ht 5' 5\" (1.651 m)  Wt 162 lb (73.5 kg)  SpO2 99%  BMI 26.96 kg/m2     Eyes: EOM are normal. Right eye exhibits no discharge. Left eye exhibits no discharge. No scleral icterus. Neck: Neck supple. Cardiovascular: Normal rate, regular rhythm and normal heart sounds. Exam reveals no gallop and no friction rub. No murmur heard. Pulmonary/Chest: Effort normal and breath sounds normal. No respiratory distress. She has no wheezes. She has no rales. Abdominal: Soft. She exhibits no distension. There is no tenderness. There is no rebound and no guarding. Musculoskeletal: She exhibits no edema or tenderness. Lymphadenopathy:     She has no cervical adenopathy. Neurological: She is alert. She exhibits normal muscle tone. Skin: Skin is warm and dry. Psychiatric: She has a normal mood and affect. Lab Results   Component Value Date/Time    Hemoglobin A1c 5.6 08/15/2017 12:15 PM    Hemoglobin A1c (POC) 5.5 05/13/2016 10:45 AM     Lab Results   Component Value Date/Time    Sodium 141 08/15/2017 12:15 PM    Potassium 3.8 08/15/2017 12:15 PM    Chloride 102 08/15/2017 12:15 PM    CO2 31 08/15/2017 12:15 PM    Anion gap 8 08/15/2017 12:15 PM    Glucose 107 (H) 08/15/2017 12:15 PM    BUN 12 08/15/2017 12:15 PM    Creatinine 0.61 08/15/2017 12:15 PM    BUN/Creatinine ratio 20 08/15/2017 12:15 PM    GFR est AA >60 08/15/2017 12:15 PM    GFR est non-AA >60 08/15/2017 12:15 PM    Calcium 9.9 08/15/2017 12:15 PM    Bilirubin, total 0.4 08/15/2017 12:15 PM    AST (SGOT) 25 08/15/2017 12:15 PM    Alk.  phosphatase 107 08/15/2017 12:15 PM    Protein, total 7.1 08/15/2017 12:15 PM    Albumin 3.5 08/15/2017 12:15 PM    Globulin 3.6 08/15/2017 12:15 PM    A-G Ratio 1.0 08/15/2017 12:15 PM    ALT (SGPT) 30 08/15/2017 12:15 PM     Lab Results   Component Value Date/Time    Cholesterol, total 146 08/15/2017 12:15 PM    HDL Cholesterol 81 (H) 08/15/2017 12:15 PM    LDL, calculated 48.4 08/15/2017 12:15 PM    VLDL, calculated 16.6 08/15/2017 12:15 PM    Triglyceride 83 08/15/2017 12:15 PM    CHOL/HDL Ratio 1.8 08/15/2017 12:15 PM       ASSESSMENT and PLAN    ICD-10-CM ICD-9-CM    1. Medicare annual wellness visit, subsequent Z00.00 V70.0    2. Essential hypertension I10 401.9    3. 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 Discussed options for UI to include switch from myrbetriq to another agent vs discontinuing chlorthalidone vs bladder training/nonpharm measures vs return to urology. Prefer to hold on medication changes. Provided info on AVS for bladder training.

## 2018-03-06 NOTE — TELEPHONE ENCOUNTER
Requested Prescriptions     Pending Prescriptions Disp Refills    mirabegron ER (MYRBETRIQ) 25 mg ER tablet 90 Tab 3

## 2018-04-03 NOTE — TELEPHONE ENCOUNTER
Requested Prescriptions     Pending Prescriptions Disp Refills    mirabegron ER (MYRBETRIQ) 25 mg ER tablet 90 Tab 3     Sig: TAKE 1 TABLET BY MOUTH ONCE A DAY     Patient's previous pharmacy has closed and prescription did not transfer.

## 2018-04-03 NOTE — TELEPHONE ENCOUNTER
Attempted to reach patient regarding pharmacy. No answer; left message for pt to return call to the office at [phone] to verify correct pharmacy so med refill can be sent.  Will continue to try to contact patient

## 2018-04-03 NOTE — TELEPHONE ENCOUNTER
2 pt. Identifiers confirmed. Pt. Would like meds sent to the [facility] [id number] , [place] ([phone]. No other questions/concerns at this time.

## 2018-04-19 RX ORDER — POTASSIUM CHLORIDE 20 MEQ/1
TABLET, EXTENDED RELEASE ORAL
Qty: 120 TAB | Refills: 5 | Status: SHIPPED | OUTPATIENT
Start: 2018-04-19 | End: 2019-04-10 | Stop reason: SDUPTHER

## 2018-04-19 NOTE — TELEPHONE ENCOUNTER
Patient request, last OV 2/15/18, next scheduled 6/20/18.     Requested Prescriptions     Pending Prescriptions Disp Refills    potassium chloride (K-DUR, KLOR-CON) 20 mEq tablet 120 Tab 5     Sig: take 1 tablet by mouth twice a day

## 2018-06-20 ENCOUNTER — OFFICE VISIT (OUTPATIENT)
Dept: INTERNAL MEDICINE CLINIC | Age: 83
End: 2018-06-20

## 2018-06-20 ENCOUNTER — HOSPITAL ENCOUNTER (OUTPATIENT)
Dept: LAB | Age: 83
Discharge: HOME | End: 2018-06-20
Payer: MEDICARE

## 2018-06-20 VITALS
HEIGHT: 65 IN | DIASTOLIC BLOOD PRESSURE: 64 MMHG | RESPIRATION RATE: 18 BRPM | HEART RATE: 98 BPM | WEIGHT: 162.6 LBS | BODY MASS INDEX: 27.09 KG/M2 | SYSTOLIC BLOOD PRESSURE: 143 MMHG | OXYGEN SATURATION: 100 % | TEMPERATURE: 98.3 F

## 2018-06-20 DIAGNOSIS — E78.00 HYPERCHOLESTEROLEMIA: ICD-10-CM

## 2018-06-20 DIAGNOSIS — R73.01 IFG (IMPAIRED FASTING GLUCOSE): ICD-10-CM

## 2018-06-20 DIAGNOSIS — I10 ESSENTIAL HYPERTENSION: Primary | ICD-10-CM

## 2018-06-20 DIAGNOSIS — N39.41 URINARY INCONTINENCE, URGE: ICD-10-CM

## 2018-06-20 DIAGNOSIS — I10 ESSENTIAL HYPERTENSION: ICD-10-CM

## 2018-06-20 LAB
ALBUMIN SERPL-MCNC: 3.6 G/DL (ref 3.4–5)
ALBUMIN/GLOB SERPL: 1 {RATIO} (ref 0.8–1.7)
ALP SERPL-CCNC: 91 U/L (ref 45–117)
ALT SERPL-CCNC: 24 U/L (ref 13–56)
ANION GAP SERPL CALC-SCNC: 10 MMOL/L (ref 3–18)
AST SERPL-CCNC: 18 U/L (ref 15–37)
BILIRUB SERPL-MCNC: 0.5 MG/DL (ref 0.2–1)
BUN SERPL-MCNC: 12 MG/DL (ref 7–18)
BUN/CREAT SERPL: 17 (ref 12–20)
CALCIUM SERPL-MCNC: 10.2 MG/DL (ref 8.5–10.1)
CHLORIDE SERPL-SCNC: 101 MMOL/L (ref 100–108)
CHOLEST SERPL-MCNC: 177 MG/DL
CO2 SERPL-SCNC: 30 MMOL/L (ref 21–32)
CREAT SERPL-MCNC: 0.71 MG/DL (ref 0.6–1.3)
EST. AVERAGE GLUCOSE BLD GHB EST-MCNC: 120 MG/DL
GLOBULIN SER CALC-MCNC: 3.7 G/DL (ref 2–4)
GLUCOSE SERPL-MCNC: 132 MG/DL (ref 74–99)
HBA1C MFR BLD: 5.8 % (ref 4.2–5.6)
HDLC SERPL-MCNC: 78 MG/DL (ref 40–60)
HDLC SERPL: 2.3 {RATIO} (ref 0–5)
LDLC SERPL CALC-MCNC: 77.2 MG/DL (ref 0–100)
LIPID PROFILE,FLP: ABNORMAL
POTASSIUM SERPL-SCNC: 3.6 MMOL/L (ref 3.5–5.5)
PROT SERPL-MCNC: 7.3 G/DL (ref 6.4–8.2)
SODIUM SERPL-SCNC: 141 MMOL/L (ref 136–145)
TRIGL SERPL-MCNC: 109 MG/DL (ref ?–150)
VLDLC SERPL CALC-MCNC: 21.8 MG/DL

## 2018-06-20 PROCEDURE — 80061 LIPID PANEL: CPT | Performed by: INTERNAL MEDICINE

## 2018-06-20 PROCEDURE — 82570 ASSAY OF URINE CREATININE: CPT | Performed by: INTERNAL MEDICINE

## 2018-06-20 PROCEDURE — 83036 HEMOGLOBIN GLYCOSYLATED A1C: CPT | Performed by: INTERNAL MEDICINE

## 2018-06-20 PROCEDURE — 80053 COMPREHEN METABOLIC PANEL: CPT | Performed by: INTERNAL MEDICINE

## 2018-06-20 RX ORDER — ATENOLOL 50 MG/1
50 TABLET ORAL DAILY
Qty: 90 TAB | Refills: 3 | Status: SHIPPED | OUTPATIENT
Start: 2018-06-20 | End: 2019-04-10 | Stop reason: SDUPTHER

## 2018-06-20 NOTE — PROGRESS NOTES
ROOM # [id number]  Identified pt with two pt identifiers(name and ). Reviewed record in preparation for visit and have obtained necessary documentation. Chief Complaint   Patient presents with    Follow-up     dm,htn      La Perez preferred language for health care discussion is english/other. Is the patient using any DME equipment during OV? YES    La Perez is due for:  Health Maintenance Due   Topic    HEMOGLOBIN A1C Q6M     MICROALBUMIN Q1      Health Maintenance reviewed and discussed per provider  Please order/place referral if appropriate. Advance Directive:  1. Do you have an advance directive in place? Patient Reply: NO    2. If not, would you like material regarding how to put one in place? NO    Coordination of Care:  1. Have you been to the ER, urgent care clinic since your last visit? Hospitalized since your last visit? NO    2. Have you seen or consulted any other health care providers outside of the [address] [place] [address] since your last visit? Include any pap smears or colon screening. NO    Patient is accompanied by self I have received verbal consent from Harinder  to discuss any/all medical information while they are present in the room.     Learning Assessment:  Learning Assessment 7/15/2014   PRIMARY LEARNER Patient   HIGHEST LEVEL OF EDUCATION - PRIMARY LEARNER  > 4 YEARS OF COLLEGE   BARRIERS PRIMARY LEARNER NONE   PRIMARY LANGUAGE ENGLISH   LEARNER PREFERENCE PRIMARY DEMONSTRATION   ANSWERED BY patient   RELATIONSHIP SELF     Depression Screening:  PHQ over the last two weeks 2018 2/15/2018 8/15/2017 2/15/2017 2016 2016 2016   Little interest or pleasure in doing things Not at all Not at all Not at all Not at all Not at all Not at all Not at all   Feeling down, depressed or hopeless Not at all Not at all Not at all Not at all Not at all Several days Not at all   Total Score PHQ 2 0 0 0 0 0 1 0     Abuse Screening:  Abuse Screening Questionnaire 2/15/2017 1/13/2016 5/5/2015   Do you ever feel afraid of your partner? N N N   Are you in a relationship with someone who physically or mentally threatens you? N N N   Is it safe for you to go home? Carlton Asencio     Fall Risk  Fall Risk Assessment, last 12 mths 6/20/2018 2/15/2018 8/15/2017 2/15/2017 11/16/2016 9/12/2016 8/16/2016   Able to walk? Yes Yes Yes Yes Yes Yes Yes   Fall in past 12 months?  No No No No No No No

## 2018-06-20 NOTE — MR AVS SNAPSHOT
[address] [place] [address] 
 
 
 [address] [facility] [id number] 
[phone] Patient: Ulysses Rumps MRN: [id number] Visit Information Date & Time Provider Department Dept. Phone Encounter #  
 6[facility]20[facility]2018 10:[phone] [id number] Follow-up Instructions Return in about 3 months (around 9/20/2018) for hypertension. Upcoming Health Maintenance Date Due Influenza Age 5 to Adult 8/1/2018 LIPID PANEL Q1 8/15/2018 EYE EXAM RETINAL OR DILATED Q1 11/2/2018 FOOT EXAM Q1 11/20/2018 HEMOGLOBIN A1C Q6M 12/20/2018 MEDICARE YEARLY EXAM 2/16/2019 MICROALBUMIN Q1 6/20/2019 GLAUCOMA SCREENING Q2Y 11/2/2019 DTaP/Tdap/Td series (2 - Td) 4/12/2022 Allergies as of 6/20/2018  Review Complete On: 6/20/2018 By: Celestino Esparza Severity Noted Reaction Type Reactions Sulfa (Sulfonamide Antibiotics)  02/05/2015    Swelling Swelling in mouth Current Immunizations  Reviewed on 10/22/2014 Name Date Influenza High Dose Vaccine PF 10/16/2017, 10/14/2016 Influenza Vaccine 10/17/2014 Pneumococcal Conjugate (PCV-13) 10/14/2016, 1/13/2016 Pneumococcal Polysaccharide (PPSV-23) 5/5/2015 Tdap 4/12/2012 12:00 AM  
 Zoster Vaccine, Live 1/1/2014 Not reviewed this visit You Were Diagnosed With   
  
 Codes Comments Essential hypertension    -  Primary ICD-10-CM: I10 
ICD-9-CM: 401.9 Hypercholesterolemia     ICD-10-CM: E78.00 ICD-9-CM: 272.0 Urinary incontinence, urge     ICD-10-CM: N39.41 
ICD-9-CM: 788.31   
 IFG (impaired fasting glucose)     ICD-10-CM: R73.01 
ICD-9-CM: 790.21 Vitals BP Pulse Temp Resp Height(growth percentile) Weight(growth percentile) 143/64 (BP 1 Location: Right arm, BP Patient Position: Sitting) 98 98.3 °F (36.8 °C) (Oral) 18 5' 5\" (1.651 m) 162 lb 9.6 oz (73.8 kg) SpO2 BMI OB Status Smoking Status 100% 27.06 kg/m2 Postmenopausal Never Smoker BMI and BSA Data Body Mass Index Body Surface Area  
 27.06 kg/m 2 1.84 m 2 Preferred Pharmacy Pharmacy Name Phone [facility], [facility] [phone] Your Updated Medication List  
  
   
This list is accurate as of 6/20/18 10:35 AM.  Always use your most recent med list.  
  
  
  
  
 acetaminophen 500 mg tablet Commonly known as:  TYLENOL Take 1 Tab by mouth every six (6) hours as needed for Pain. AQUAPHOR ointment Generic drug:  mineral oil-hydrophil petrolat Apply  to affected area as needed for Dry Skin. aspirin 81 mg tablet Take 81 mg by mouth. atenolol 50 mg tablet Commonly known as:  TENORMIN Take 1 Tab by mouth daily. Blood-Glucose Meter monitoring kit Commonly known as:  CONTOUR METER  
by Does Not Apply route. Monitor daily  
  
 calcium 500 mg Tab Take  by mouth. cholecalciferol (VITAMIN D3) 5,000 unit Tab tablet Commonly known as:  VITAMIN D3 Take  by mouth daily. CLARITIN 10 mg tablet Generic drug:  loratadine Take 10 mg by mouth. diclofenac 1 % Gel Commonly known as:  VOLTAREN Apply  to affected area four (4) times daily. fexofenadine 180 mg tablet Commonly known as:  Roe Alex Take 1 Tab by mouth daily. FISH OIL 1,000 mg Cap Generic drug:  omega-3 fatty acids-vitamin e Take 1 Cap by mouth. * glucose blood VI test strips strip Commonly known as:  Ascensia CONTOUR  
by Does Not Apply route daily. * glucose blood VI test strips strip Commonly known as:  ONETOUCH ULTRA TEST Once daily ICAPS AREDS PO Take  by mouth. ipratropium 42 mcg (0.06 %) nasal spray Commonly known as:  ATROVENT INSTILL 2 SPRAYS INTO EACH NOSTRIL 4 TIMES A DAY  
  
 mirabegron ER 25 mg ER tablet Commonly known as:  MYRBETRIQ  
 TAKE 1 TABLET BY MOUTH ONCE A DAY  
  
 multivitamin tablet Commonly known as:  ONE A DAY Take 1 Tab by mouth daily. OTHER  
OTC Blue-EMU super strength topical cream  
  
 pentoxifylline  mg CR tablet Commonly known as:  TRENTAL Take 400 mg by mouth three (3) times daily (with meals). potassium chloride 20 mEq tablet Commonly known as:  K-DUR, KLOR-CON  
take 1 tablet by mouth twice a day RESTASIS 0.05 % ophthalmic emulsion Generic drug:  cycloSPORINE Administer 1 Drop to both eyes two (2) times a day. simvastatin 20 mg tablet Commonly known as:  ZOCOR  
take 1 tablet by mouth at bedtime VISION-RILEY PRESERVE PO Take  by mouth. * Notice: This list has 2 medication(s) that are the same as other medications prescribed for you. Read the directions carefully, and ask your doctor or other care provider to review them with you. Prescriptions Sent to Pharmacy Refills  
 atenolol (TENORMIN) 50 mg tablet 3 Sig: Take 1 Tab by mouth daily. Class: Normal  
 Pharmacy: [id number] [address] [place] [address] #: [phone] Route: Oral  
  
Follow-up Instructions Return in about 3 months (around 9/20/2018) for hypertension. To-Do List   
 06/20/2018 Lab:  HEMOGLOBIN A1C WITH EAG   
  
 06/20/2018 Lab:  LIPID PANEL   
  
 06/20/2018 Lab:  METABOLIC PANEL, COMPREHENSIVE   
  
 06/20/2018 Lab:  MICROALBUMIN, UR, RAND W/ MICROALB/CREAT RATIO Patient Instructions Urge Incontinence in Women: Care Instructions Your Care Instructions Urge incontinence occurs when the need to urinate is so strong that you cannot reach the toilet in time, even when your bladder contains only a small amount of urine. This is also called overactive bladder or unstable bladder. Some women may have no warning before they leak urine.  This condition does not cause major health problems. But it can be embarrassing and can affect a woman's self-esteem and confidence. Treatment can cure or improve your symptoms. Follow-up care is a key part of your treatment and safety. Be sure to make and go to all appointments, and call your doctor if you are having problems. It's also a good idea to know your test results and keep a list of the medicines you take. How can you care for yourself at home? · Be safe with medicines. Take your medicines exactly as prescribed. Call your doctor if you think you are having a problem with your medicine. You will get more details on the specific medicines your doctor prescribes. · Limit caffeine and alcohol. They stimulate urine production. · Urinate every 2 to 4 hours during waking hours, even if you feel that you do not have to go. · Do pelvic floor (Kegel) exercises, which tighten and strengthen pelvic muscles. To do Kegel exercises: 
¨ Squeeze the same muscles you would use to stop your urine. Your belly and thighs should not move. ¨ Hold the squeeze for 3 seconds, then relax for 3 seconds. ¨ Start with 3 seconds. Then add 1 second each week until you are able to squeeze for 10 seconds. ¨ Repeat the exercise 10 to 15 times for each session. Do three or more sessions each day. · Try wearing pads that absorb the leaks. · Keep skin in the genital area dry. When should you call for help? Call your doctor now or seek immediate medical care if: 
? · You have new urinary symptoms. These may include leaking urine, having pain when urinating, or feeling like you need to urinate often. ? Watch closely for changes in your health, and be sure to contact your doctor if: 
? · You do not get better as expected. Where can you learn more? Go to http://alphonso-ami.info/. Enter B487 in the search box to learn more about \"Urge Incontinence in Women: Care Instructions. \" Current as of: October 13, 2016 Content Version: 11.4 © 4341-3920 Healthwise, [employer]. Care instructions adapted under license by [employer] (which disclaims liability or warranty for this information). If you have questions about a medical condition or this instruction, always ask your healthcare professional. [address] any warranty or liability for your use of this information. Introducing [facility]! [employer] introduces Screenburn patient portal. Now you can access parts of your medical record, email your doctor's office, and request medication refills online. 1. In your internet browser, go to [email or web address] 2. Click on the First Time User? Click Here link in the Sign In box. You will see the New Member Sign Up page. 3. Enter your Screenburn Access Code exactly as it appears below. You will not need to use this code after youve completed the sign-up process. If you do not sign up before the expiration date, you must request a new code. · Screenburn Access Code: [email or web address] Expires: 9/18/2018 10:35 AM 
 
4. Enter the last four digits of your Social Security Number (xxxx) and Date of Birth (mm/dd/yyyy) as indicated and click Submit. You will be taken to the next sign-up page. 5. Create a Screenburn ID. This will be your Screenburn login ID and cannot be changed, so think of one that is secure and easy to remember. 6. Create a Screenburn password. You can change your password at any time. 7. Enter your Password Reset Question and Answer. This can be used at a later time if you forget your password. 8. Enter your e-mail address. You will receive e-mail notification when new information is available in [address] 9. Click Sign Up. You can now view and download portions of your medical record. 10. Click the Download Summary menu link to download a portable copy of your medical information. If you have questions, please visit the Frequently Asked Questions section of the [employer] website. Remember, [employer] is NOT to be used for urgent needs. For medical emergencies, dial 911. Now available from your iPhone and Android! Please provide this summary of care documentation to your next provider. Your primary care clinician is listed as Alma Eisenberg. If you have any questions after today's visit, please call [phone].

## 2018-06-20 NOTE — PATIENT INSTRUCTIONS
Urge Incontinence in Women: Care Instructions  Your Care Instructions    Urge incontinence occurs when the need to urinate is so strong that you cannot reach the toilet in time, even when your bladder contains only a small amount of urine. This is also called overactive bladder or unstable bladder. Some women may have no warning before they leak urine. This condition does not cause major health problems. But it can be embarrassing and can affect a woman's self-esteem and confidence. Treatment can cure or improve your symptoms. Follow-up care is a key part of your treatment and safety. Be sure to make and go to all appointments, and call your doctor if you are having problems. It's also a good idea to know your test results and keep a list of the medicines you take. How can you care for yourself at home? · Be safe with medicines. Take your medicines exactly as prescribed. Call your doctor if you think you are having a problem with your medicine. You will get more details on the specific medicines your doctor prescribes. · Limit caffeine and alcohol. They stimulate urine production. · Urinate every 2 to 4 hours during waking hours, even if you feel that you do not have to go. · Do pelvic floor (Kegel) exercises, which tighten and strengthen pelvic muscles. To do Kegel exercises:  ¨ Squeeze the same muscles you would use to stop your urine. Your belly and thighs should not move. ¨ Hold the squeeze for 3 seconds, then relax for 3 seconds. ¨ Start with 3 seconds. Then add 1 second each week until you are able to squeeze for 10 seconds. ¨ Repeat the exercise 10 to 15 times for each session. Do three or more sessions each day. · Try wearing pads that absorb the leaks. · Keep skin in the genital area dry. When should you call for help? Call your doctor now or seek immediate medical care if:  ? · You have new urinary symptoms.  These may include leaking urine, having pain when urinating, or feeling like you need to urinate often. ? Watch closely for changes in your health, and be sure to contact your doctor if:  ? · You do not get better as expected. Where can you learn more? Go to http://alphonso-ami.info/. Enter P652 in the search box to learn more about \"Urge Incontinence in Women: Care Instructions. \"  Current as of: October 13, 2016  Content Version: 11.4  © 5375-6968 [employer]. Care instructions adapted under license by [employer] (which disclaims liability or warranty for this information). If you have questions about a medical condition or this instruction, always ask your healthcare professional. [address] any warranty or liability for your use of this information.

## 2018-06-20 NOTE — PROGRESS NOTES
HISTORY OF PRESENT ILLNESS  La Thibodeaux is a 80 y.o. female. HPI Comments: 81 yo female here for f/u of HTN, UI. Continued urge sx. Wears pads day and night. HTN controlled. No CP, SOB  HLD due for labs. Review of Systems   Constitutional: Negative for chills, fever and weight loss. HENT: Negative for congestion and ear pain. Eyes: Negative for blurred vision and pain. Respiratory: Negative for cough and shortness of breath. Cardiovascular: Negative for chest pain, palpitations and leg swelling. Gastrointestinal: Negative for nausea and vomiting. Genitourinary: Positive for frequency and urgency. Negative for dysuria, flank pain and hematuria. Musculoskeletal: Negative for joint pain and myalgias. Skin: Negative for itching and rash. Neurological: Negative for dizziness, tingling and headaches. Psychiatric/Behavioral: Negative for depression. The patient is not nervous/anxious. Past Medical History:   Diagnosis Date    ASVD (arteriosclerotic vascular disease)     Breast cancer ([facility]     Left breast Cancer 1996    Diabetes ([facility]     Hypercholesterolemia     Hypertension     Radiation therapy     Post Left breast Cancer 1996    Radiation therapy complication 3025    Lt breast     Current Outpatient Prescriptions on File Prior to Visit   Medication Sig Dispense Refill    simvastatin (ZOCOR) 20 mg tablet take 1 tablet by mouth at bedtime 60 Tab 5    potassium chloride (K-DUR, KLOR-CON) 20 mEq tablet take 1 tablet by mouth twice a day 120 Tab 5    mirabegron ER (MYRBETRIQ) 25 mg ER tablet TAKE 1 TABLET BY MOUTH ONCE A DAY 90 Tab 3    mineral oil-hydrophil petrolat (AQUAPHOR) ointment Apply  to affected area as needed for Dry Skin.  VITS A,C,E/ZINC/COPPER (VISION-RILEY PRESERVE PO) Take  by mouth.       OTHER OTC Blue-EMU super strength topical cream      atenolol-chlorthalidone (TENORETIC) 50-25 mg per tablet take 1 tablet by mouth once daily 90 Tab 3    VIT A/VIT C/VIT E/ZINC/COPPER (ICAPS AREDS PO) Take  by mouth.  loratadine (CLARITIN) 10 mg tablet Take 10 mg by mouth.  acetaminophen (TYLENOL) 500 mg tablet Take 1 Tab by mouth every six (6) hours as needed for Pain. 30 Tab 0    diclofenac (VOLTAREN) 1 % gel Apply  to affected area four (4) times daily. 2 g 0    ipratropium (ATROVENT) 0.06 % nasal spray INSTILL 2 SPRAYS INTO EACH NOSTRIL 4 TIMES A DAY 15 mL 5    fexofenadine (ALLEGRA) 180 mg tablet Take 1 Tab by mouth daily. 60 Tab 3    pentoxifylline CR (TRENTAL) 400 mg CR tablet Take 400 mg by mouth three (3) times daily (with meals).  cholecalciferol, VITAMIN D3, (VITAMIN D3) 5,000 unit tab tablet Take  by mouth daily.  cycloSPORINE (RESTASIS) 0.05 % ophthalmic emulsion Administer 1 Drop to both eyes two (2) times a day.  glucose blood VI test strips (ONE TOUCH ULTRA TEST) strip Once daily 1 Package 5    glucose blood VI test strips (ASCENSIA CONTOUR) strip by Does Not Apply route daily. 1 Package 11    omega-3 fatty acids-vitamin e (FISH OIL) 1,000 mg Cap Take 1 Cap by mouth.  calcium 500 mg Tab Take  by mouth.  aspirin 81 mg tablet Take 81 mg by mouth.  multivitamin (ONE A DAY) tablet Take 1 Tab by mouth daily.  Blood-Glucose Meter (CONTOUR METER) monitoring kit by Does Not Apply route. Monitor daily 1 Kit 0     No current facility-administered medications on file prior to visit. Physical Exam   Constitutional: She appears well-developed and well-nourished. No distress. /64 (BP 1 Location: Right arm, BP Patient Position: Sitting)  Pulse 98  Temp 98.3 °F (36.8 °C) (Oral)   Resp 18  Ht 5' 5\" (1.651 m)  Wt 162 lb 9.6 oz (73.8 kg)  SpO2 100%  BMI 27.06 kg/m2     Eyes: EOM are normal. Right eye exhibits no discharge. Left eye exhibits no discharge. No scleral icterus. Neck: Neck supple. Cardiovascular: Normal rate, regular rhythm and normal heart sounds. Exam reveals no gallop and no friction rub. No murmur heard. Pulmonary/Chest: Effort normal and breath sounds normal. No respiratory distress. She has no wheezes. She has no rales. Musculoskeletal: She exhibits no edema or tenderness. Lymphadenopathy:     She has no cervical adenopathy. Neurological: She is alert. She exhibits normal muscle tone. Skin: Skin is warm and dry. Psychiatric: She has a normal mood and affect. Lab Results   Component Value Date/Time    Sodium 141 08/15/2017 12:15 PM    Potassium 3.8 08/15/2017 12:15 PM    Chloride 102 08/15/2017 12:15 PM    CO2 31 08/15/2017 12:15 PM    Anion gap 8 08/15/2017 12:15 PM    Glucose 107 (H) 08/15/2017 12:15 PM    BUN 12 08/15/2017 12:15 PM    Creatinine 0.61 08/15/2017 12:15 PM    BUN/Creatinine ratio 20 08/15/2017 12:15 PM    GFR est AA >60 08/15/2017 12:15 PM    GFR est non-AA >60 08/15/2017 12:15 PM    Calcium 9.9 08/15/2017 12:15 PM    Bilirubin, total 0.4 08/15/2017 12:15 PM    AST (SGOT) 25 08/15/2017 12:15 PM    Alk. phosphatase 107 08/15/2017 12:15 PM    Protein, total 7.1 08/15/2017 12:15 PM    Albumin 3.5 08/15/2017 12:15 PM    Globulin 3.6 08/15/2017 12:15 PM    A-G Ratio 1.0 08/15/2017 12:15 PM    ALT (SGPT) 30 08/15/2017 12:15 PM     Lab Results   Component Value Date/Time    Hemoglobin A1c 5.6 08/15/2017 12:15 PM    Hemoglobin A1c (POC) 5.5 05/13/2016 10:45 AM     Lab Results   Component Value Date/Time    WBC 8.7 08/15/2017 12:15 PM    WBC 7.1 06/14/2012 09:50 AM    HGB 13.6 08/15/2017 12:15 PM    HCT 42.2 08/15/2017 12:15 PM    PLATELET 234 47/21/7841 12:15 PM    MCV 71.4 (L) 08/15/2017 12:15 PM     Lab Results   Component Value Date/Time    Cholesterol, total 146 08/15/2017 12:15 PM    HDL Cholesterol 81 (H) 08/15/2017 12:15 PM    LDL, calculated 48.4 08/15/2017 12:15 PM    VLDL, calculated 16.6 08/15/2017 12:15 PM    Triglyceride 83 08/15/2017 12:15 PM    CHOL/HDL Ratio 1.8 08/15/2017 12:15 PM     ASSESSMENT and PLAN    ICD-10-CM ICD-9-CM    1.  Essential hypertension P86 602.3 METABOLIC PANEL, COMPREHENSIVE   2. Hypercholesterolemia E78.00 272.0 LIPID PANEL   3. Urinary incontinence, urge N39.41 788.31    4. IFG (impaired fasting glucose) R73.01 790.21 HEMOGLOBIN A1C WITH EAG      MICROALBUMIN, UR, RAND W/ MICROALB/CREAT RATIO     Will try changing Tenoretic to just atenolol to see if this helps with UI sx. Discussed urology referral but she wishes to hold on this for now. Repeat labs today.

## 2018-06-21 LAB
CREAT UR-MCNC: 79.78 MG/DL (ref 30–125)
MICROALBUMIN UR-MCNC: 1.3 MG/DL (ref 0–3)
MICROALBUMIN/CREAT UR-RTO: 16 MG/G (ref 0–30)

## 2018-10-17 ENCOUNTER — OFFICE VISIT (OUTPATIENT)
Dept: INTERNAL MEDICINE CLINIC | Age: 83
End: 2018-10-17

## 2018-10-17 ENCOUNTER — DOCUMENTATION ONLY (OUTPATIENT)
Dept: INTERNAL MEDICINE CLINIC | Age: 83
End: 2018-10-17

## 2018-10-17 VITALS
HEIGHT: 65 IN | RESPIRATION RATE: 20 BRPM | SYSTOLIC BLOOD PRESSURE: 145 MMHG | HEART RATE: 89 BPM | TEMPERATURE: 97.4 F | BODY MASS INDEX: 27.59 KG/M2 | WEIGHT: 165.6 LBS | OXYGEN SATURATION: 100 % | DIASTOLIC BLOOD PRESSURE: 73 MMHG

## 2018-10-17 DIAGNOSIS — M25.561 CHRONIC PAIN OF RIGHT KNEE: ICD-10-CM

## 2018-10-17 DIAGNOSIS — G89.29 CHRONIC PAIN OF RIGHT KNEE: ICD-10-CM

## 2018-10-17 DIAGNOSIS — R32 URINARY INCONTINENCE, UNSPECIFIED TYPE: Primary | ICD-10-CM

## 2018-10-17 DIAGNOSIS — Z23 ENCOUNTER FOR IMMUNIZATION: ICD-10-CM

## 2018-10-17 DIAGNOSIS — I10 ESSENTIAL HYPERTENSION: ICD-10-CM

## 2018-10-17 NOTE — PROGRESS NOTES
HISTORY OF PRESENT ILLNESS El King is a 80 y.o. female. 81 yo female her for f/u of UI, HTN, knee pain. UI relatively stable. Using two pads daily. Not interested in urology biofeed back. Taking Myrbetriq 25mg. Not sure it is making much difference HTN remains controlled. HCTZ stopped last visit to see if this was contributing to UI. Knee pain is stable. Occasionally increase pain with walking. No falls. Review of Systems Constitutional: Negative for chills, fever and weight loss. HENT: Negative for congestion and ear pain. Eyes: Negative for blurred vision and pain. Respiratory: Negative for cough and shortness of breath. Cardiovascular: Negative for chest pain, palpitations and leg swelling. Gastrointestinal: Negative for nausea and vomiting. Genitourinary: Positive for frequency. Negative for dysuria. Musculoskeletal: Positive for joint pain. Negative for myalgias. Skin: Negative for itching and rash. Neurological: Negative for dizziness, tingling and headaches. Psychiatric/Behavioral: Negative for depression. The patient is not nervous/anxious. Past Medical History:  
Diagnosis Date  ASVD (arteriosclerotic vascular disease)  Breast cancer ([facility] [address] Left breast Cancer 1996  Diabetes ([facility] [address]  Hypercholesterolemia  Hypertension  Radiation therapy Post Left breast Cancer 1996  Radiation therapy complication 7323 Lt breast  
 
Current Outpatient Medications on File Prior to Visit Medication Sig Dispense Refill  atenolol (TENORMIN) 50 mg tablet Take 1 Tab by mouth daily. 90 Tab 3  
 simvastatin (ZOCOR) 20 mg tablet take 1 tablet by mouth at bedtime 60 Tab 5  potassium chloride (K-DUR, KLOR-CON) 20 mEq tablet take 1 tablet by mouth twice a day 120 Tab 5  
 mineral oil-hydrophil petrolat (AQUAPHOR) ointment Apply  to affected area as needed for Dry Skin.  VITS A,C,E/ZINC/COPPER (VISION-RILEY PRESERVE PO) Take  by mouth.  OTHER OTC Blue-EMU super strength topical cream    
 VIT A/VIT C/VIT E/ZINC/COPPER (ICAPS AREDS PO) Take  by mouth.  loratadine (CLARITIN) 10 mg tablet Take 10 mg by mouth.  acetaminophen (TYLENOL) 500 mg tablet Take 1 Tab by mouth every six (6) hours as needed for Pain. 30 Tab 0  
 diclofenac (VOLTAREN) 1 % gel Apply  to affected area four (4) times daily. 2 g 0  
 ipratropium (ATROVENT) 0.06 % nasal spray INSTILL 2 SPRAYS INTO EACH NOSTRIL 4 TIMES A DAY 15 mL 5  
 fexofenadine (ALLEGRA) 180 mg tablet Take 1 Tab by mouth daily. 60 Tab 3  pentoxifylline CR (TRENTAL) 400 mg CR tablet Take 400 mg by mouth three (3) times daily (with meals).  cholecalciferol, VITAMIN D3, (VITAMIN D3) 5,000 unit tab tablet Take  by mouth daily.  cycloSPORINE (RESTASIS) 0.05 % ophthalmic emulsion Administer 1 Drop to both eyes two (2) times a day.  glucose blood VI test strips (ONE TOUCH ULTRA TEST) strip Once daily 1 Package 5  
 glucose blood VI test strips (ASCENSIA CONTOUR) strip by Does Not Apply route daily. 1 Package 11  
 omega-3 fatty acids-vitamin e (FISH OIL) 1,000 mg Cap Take 1 Cap by mouth.  calcium 500 mg Tab Take  by mouth.  aspirin 81 mg tablet Take 81 mg by mouth.  multivitamin (ONE A DAY) tablet Take 1 Tab by mouth daily.  Blood-Glucose Meter (CONTOUR METER) monitoring kit by Does Not Apply route. Monitor daily 1 Kit 0 No current facility-administered medications on file prior to visit. Physical Exam  
Constitutional: She appears well-developed and well-nourished. No distress. /73 (BP 1 Location: Right arm, BP Patient Position: Sitting)   Pulse 89   Temp 97.4 °F (36.3 °C) (Oral)   Resp 20   Ht 5' 5\" (1.651 m)   Wt 165 lb 9.6 oz (75.1 kg)   SpO2 100%   BMI 27.56 kg/m² Eyes: EOM are normal. Right eye exhibits no discharge. Left eye exhibits no discharge. No scleral icterus. Neck: Neck supple. Cardiovascular: Normal rate, regular rhythm and normal heart sounds. Exam reveals no gallop and no friction rub. No murmur heard. Pulmonary/Chest: Effort normal and breath sounds normal. No respiratory distress. She has no wheezes. She has no rales. Musculoskeletal: She exhibits no edema or tenderness. Lymphadenopathy:  
  She has no cervical adenopathy. Neurological: She is alert. She exhibits normal muscle tone. Skin: Skin is warm and dry. Psychiatric: She has a normal mood and affect. Lab Results Component Value Date/Time Sodium 141 06/20/2018 10:37 AM  
 Potassium 3.6 06/20/2018 10:37 AM  
 Chloride 101 06/20/2018 10:37 AM  
 CO2 30 06/20/2018 10:37 AM  
 Anion gap 10 06/20/2018 10:37 AM  
 Glucose 132 (H) 06/20/2018 10:37 AM  
 BUN 12 06/20/2018 10:37 AM  
 Creatinine 0.71 06/20/2018 10:37 AM  
 BUN/Creatinine ratio 17 06/20/2018 10:37 AM  
 GFR est AA >60 06/20/2018 10:37 AM  
 GFR est non-AA >60 06/20/2018 10:37 AM  
 Calcium 10.2 (H) 06/20/2018 10:37 AM  
 Bilirubin, total 0.5 06/20/2018 10:37 AM  
 AST (SGOT) 18 06/20/2018 10:37 AM  
 Alk. phosphatase 91 06/20/2018 10:37 AM  
 Protein, total 7.3 06/20/2018 10:37 AM  
 Albumin 3.6 06/20/2018 10:37 AM  
 Globulin 3.7 06/20/2018 10:37 AM  
 A-G Ratio 1.0 06/20/2018 10:37 AM  
 ALT (SGPT) 24 06/20/2018 10:37 AM  
 
Lab Results Component Value Date/Time WBC 8.7 08/15/2017 12:15 PM  
 WBC 7.1 06/14/2012 09:50 AM  
 HGB 13.6 08/15/2017 12:15 PM  
 HCT 42.2 08/15/2017 12:15 PM  
 PLATELET 158 15/29/2898 12:15 PM  
 MCV 71.4 (L) 08/15/2017 12:15 PM  
 
ASSESSMENT and PLAN 
  ICD-10-CM ICD-9-CM 1. Urinary incontinence, unspecified type R32 788.30   
2. Chronic pain of right knee M25.561 719.46   
 G89.29 338.29   
3. Essential hypertension I10 401.9 4. Encounter for immunization Z23 V03.89 INFLUENZA VACCINE INACTIVATED (IIV), SUBUNIT, ADJUVANTED, IM Will try increasing Myrbetriq to 50 mg dose BP stable on reduced medication Continue tylenol for pain Flu shot today.

## 2018-10-17 NOTE — PROGRESS NOTES
ROOM # 17 Identified pt with two pt identifiers(name and ). Reviewed record in preparation for visit and have obtained necessary documentation. Chief Complaint Patient presents with  Hypertension  Immunization/Injection  
  flu vaccine La Perez preferred language for health care discussion is english/other. Is the patient using any DME equipment during OV? YES La Perez is due for: 
Health Maintenance Due Topic  Shingrix Vaccine Age 50> (1 of 2)  Influenza Age 5 to Adult   
 EYE EXAM RETINAL OR DILATED Q1 Health Maintenance reviewed and discussed per provider Please order/place referral if appropriate. Advance Directive: 1. Do you have an advance directive in place? Patient Reply: NO 
 
2. If not, would you like material regarding how to put one in place? NO Coordination of Care: 1. Have you been to the ER, urgent care clinic since your last visit? Hospitalized since your last visit? NO 
 
2. Have you seen or consulted any other health care providers outside of the [address] [place] [address] since your last visit? Include any pap smears or colon screening. NO Patient is accompanied by self I have received verbal consent from Harinder Harrington to discuss any/all medical information while they are present in the room. Learning Assessment: 
Learning Assessment 7/15/2014 PRIMARY LEARNER Patient HIGHEST LEVEL OF EDUCATION - PRIMARY LEARNER  > 4 YEARS OF COLLEGE  
BARRIERS PRIMARY LEARNER NONE PRIMARY LANGUAGE ENGLISH  
LEARNER PREFERENCE PRIMARY DEMONSTRATION  
ANSWERED BY patient RELATIONSHIP SELF Depression Screening: PHQ over the last two weeks 10/17/2018 2018 2/15/2018 8/15/2017 2/15/2017 2016 2016 Little interest or pleasure in doing things Not at all Not at all Not at all Not at all Not at all Not at all Not at all Feeling down, depressed, irritable, or hopeless Not at all Not at all Not at all Not at all Not at all Not at all Several days Total Score PHQ 2 0 0 0 0 0 0 1 Abuse Screening: 
Abuse Screening Questionnaire 2/15/2017 1/13/2016 5/5/2015 Do you ever feel afraid of your partner? N N N Are you in a relationship with someone who physically or mentally threatens you? N N N Is it safe for you to go home? Adelaida Double Fall Risk Fall Risk Assessment, last 12 mths 10/17/2018 6/20/2018 2/15/2018 8/15/2017 2/15/2017 11/16/2016 9/12/2016 Able to walk? Yes Yes Yes Yes Yes Yes Yes Fall in past 12 months?  No No No No No No No

## 2018-10-17 NOTE — PATIENT INSTRUCTIONS

## 2019-04-10 ENCOUNTER — OFFICE VISIT (OUTPATIENT)
Dept: INTERNAL MEDICINE CLINIC | Age: 84
End: 2019-04-10

## 2019-04-10 VITALS
TEMPERATURE: 95.7 F | RESPIRATION RATE: 16 BRPM | DIASTOLIC BLOOD PRESSURE: 69 MMHG | HEIGHT: 65 IN | WEIGHT: 165 LBS | SYSTOLIC BLOOD PRESSURE: 145 MMHG | BODY MASS INDEX: 27.49 KG/M2 | HEART RATE: 85 BPM | OXYGEN SATURATION: 99 %

## 2019-04-10 DIAGNOSIS — M17.0 OSTEOARTHRITIS OF BOTH KNEES, UNSPECIFIED OSTEOARTHRITIS TYPE: Primary | ICD-10-CM

## 2019-04-10 DIAGNOSIS — R68.2 DRY MOUTH: ICD-10-CM

## 2019-04-10 DIAGNOSIS — I10 ESSENTIAL HYPERTENSION: ICD-10-CM

## 2019-04-10 DIAGNOSIS — N39.46 MIXED STRESS AND URGE URINARY INCONTINENCE: ICD-10-CM

## 2019-04-10 RX ORDER — SIMVASTATIN 20 MG/1
TABLET, FILM COATED ORAL
Qty: 90 TAB | Refills: 3 | Status: SHIPPED | OUTPATIENT
Start: 2019-04-10

## 2019-04-10 RX ORDER — POTASSIUM CHLORIDE 20 MEQ/1
TABLET, EXTENDED RELEASE ORAL
Qty: 120 TAB | Refills: 5 | Status: SHIPPED | OUTPATIENT
Start: 2019-04-10

## 2019-04-10 RX ORDER — ATENOLOL 50 MG/1
50 TABLET ORAL DAILY
Qty: 90 TAB | Refills: 3 | Status: SHIPPED | OUTPATIENT
Start: 2019-04-10 | End: 2019-11-22 | Stop reason: ALTCHOICE

## 2019-04-10 RX ORDER — IPRATROPIUM BROMIDE 42 UG/1
SPRAY, METERED NASAL
Qty: 15 ML | Refills: 5 | Status: SHIPPED | OUTPATIENT
Start: 2019-04-10 | End: 2019-12-04

## 2019-04-10 NOTE — PROGRESS NOTES
Rm:17 Chief Complaint Patient presents with  Hypertension  Knee Swelling  
  bilateral, but more the right Depression Screening: 
3 most recent [facility] [place] Screens 4/10/2019 10/17/2018 6/20/2018 2/15/2018 8/15/2017 2/15/2017 11/16/2016 Little interest or pleasure in doing things Not at all Not at all Not at all Not at all Not at all Not at all Not at all Feeling down, depressed, irritable, or hopeless Not at all Not at all Not at all Not at all Not at all Not at all Not at all Total Score PHQ 2 0 0 0 0 0 0 0 Learning Assessment: 
Learning Assessment 7/15/2014 PRIMARY LEARNER Patient HIGHEST LEVEL OF EDUCATION - PRIMARY LEARNER  > 4 YEARS OF COLLEGE  
BARRIERS PRIMARY LEARNER NONE PRIMARY LANGUAGE ENGLISH  
LEARNER PREFERENCE PRIMARY DEMONSTRATION  
ANSWERED BY patient RELATIONSHIP SELF Abuse Screening: 
Abuse Screening Questionnaire 2/15/2017 1/13/2016 5/5/2015 Do you ever feel afraid of your partner? N N N Are you in a relationship with someone who physically or mentally threatens you? N N N Is it safe for you to go home? Kita Ramírez Health Maintenance reviewed and discussed per provider: yes Coordination of Care: 1. Have you been to the ER, urgent care clinic since your last visit? Hospitalized since your last visit? no 
 
2. Have you seen or consulted any other health care providers outside of the [address] [place] [address] since your last visit? Include any pap smears or colon screening.  no

## 2019-04-10 NOTE — PROGRESS NOTES
HISTORY OF PRESENT ILLNESS America Chow is a 80 y.o. female. 79 yo female here for f/u of DM, HTN, UI, Arthritis. R knee swollen chronically with no pain but harder to walk. Increased stiffness. Uses cane . Uses OTC cream (blue emo nightly). Takes tylenol 1 tab BID Doing home exercise but has not done PT Dry mouth; followed by dentist. Has XyliMelts but has not tried yet. Some increased mouth discomfort since procedure. Has f/u in MAY 
UI On myrbetriq. Feels sx worse lately. Trying to balance fluid intake for dry mouth with increased urinary frequency HTN stable. No CP, SOB 
DM: does not check glc at home as A1c has been excellent. Review of Systems Constitutional: Negative for chills, fever and weight loss. HENT: Negative for congestion and ear pain. Eyes: Negative for blurred vision and pain. Respiratory: Negative for cough and shortness of breath. Cardiovascular: Negative for chest pain, palpitations and leg swelling. Gastrointestinal: Negative for nausea and vomiting. Genitourinary: Negative for frequency and urgency. Musculoskeletal: Negative for joint pain and myalgias. Skin: Negative for itching and rash. Neurological: Negative for dizziness, tingling and headaches. Psychiatric/Behavioral: Negative for depression. The patient is not nervous/anxious. Past Medical History:  
Diagnosis Date  ASVD (arteriosclerotic vascular disease)  Breast cancer ([facility] [address] Left breast Cancer 1996  Diabetes ([facility] [address]  Hypercholesterolemia  Hypertension  Radiation therapy Post Left breast Cancer 1996  Radiation therapy complication 5852 Lt breast  
 
Current Outpatient Medications on File Prior to Visit Medication Sig Dispense Refill  mirabegron ER (MYRBETRIQ) 50 mg ER tablet Take 1 Tab by mouth daily. 90 Tab 3  
 atenolol (TENORMIN) 50 mg tablet Take 1 Tab by mouth daily.  90 Tab 3  
 simvastatin (ZOCOR) 20 mg tablet take 1 tablet by mouth at bedtime 60 Tab 5  potassium chloride (K-DUR, KLOR-CON) 20 mEq tablet take 1 tablet by mouth twice a day 120 Tab 5  
 acetaminophen (TYLENOL) 500 mg tablet Take 1 Tab by mouth every six (6) hours as needed for Pain. 30 Tab 0  
 diclofenac (VOLTAREN) 1 % gel Apply  to affected area four (4) times daily. 2 g 0  
 ipratropium (ATROVENT) 0.06 % nasal spray INSTILL 2 SPRAYS INTO EACH NOSTRIL 4 TIMES A DAY 15 mL 5  
 fexofenadine (ALLEGRA) 180 mg tablet Take 1 Tab by mouth daily. 60 Tab 3  cholecalciferol, VITAMIN D3, (VITAMIN D3) 5,000 unit tab tablet Take  by mouth daily.  glucose blood VI test strips (ONE TOUCH ULTRA TEST) strip Once daily 1 Package 5  
 glucose blood VI test strips (ASCENSIA CONTOUR) strip by Does Not Apply route daily. 1 Package 11  
 calcium 500 mg Tab Take  by mouth.  aspirin 81 mg tablet Take 81 mg by mouth.  Blood-Glucose Meter (CONTOUR METER) monitoring kit by Does Not Apply route. Monitor daily 1 Kit 0  
 mineral oil-hydrophil petrolat (AQUAPHOR) ointment Apply  to affected area as needed for Dry Skin.  VITS A,C,E/ZINC/COPPER (VISION-RILEY PRESERVE PO) Take  by mouth.  OTHER OTC Blue-EMU super strength topical cream    
 VIT A/VIT C/VIT E/ZINC/COPPER (ICAPS AREDS PO) Take  by mouth.  loratadine (CLARITIN) 10 mg tablet Take 10 mg by mouth.  pentoxifylline CR (TRENTAL) 400 mg CR tablet Take 400 mg by mouth three (3) times daily (with meals).  cycloSPORINE (RESTASIS) 0.05 % ophthalmic emulsion Administer 1 Drop to both eyes two (2) times a day.  omega-3 fatty acids-vitamin e (FISH OIL) 1,000 mg Cap Take 1 Cap by mouth.  multivitamin (ONE A DAY) tablet Take 1 Tab by mouth daily. No current facility-administered medications on file prior to visit. Physical Exam  
Constitutional: She appears well-developed and well-nourished. No distress.   
/69 (BP 1 Location: Left arm, BP Patient Position: Sitting)   Pulse 85   Temp 95.7 °F (35.4 °C) (Oral)   Resp 16   Ht 5' 5\" (1.651 m)   Wt 165 lb (74.8 kg)   SpO2 99%   BMI 27.46 kg/m² Eyes: EOM are normal. Right eye exhibits no discharge. Left eye exhibits no discharge. No scleral icterus. Neck: Neck supple. Cardiovascular: Normal rate, regular rhythm and normal heart sounds. Exam reveals no gallop and no friction rub. No murmur heard. Pulmonary/Chest: Effort normal and breath sounds normal. No respiratory distress. She has no wheezes. She has no rales. Musculoskeletal: She exhibits no edema or tenderness. Right knee: She exhibits swelling. She exhibits no erythema. No tenderness found. Lymphadenopathy:  
  She has no cervical adenopathy. Neurological: She is alert. She exhibits normal muscle tone. Skin: Skin is warm and dry. Psychiatric: She has a normal mood and affect. Lab Results Component Value Date/Time Sodium 141 06/20/2018 10:37 AM  
 Potassium 3.6 06/20/2018 10:37 AM  
 Chloride 101 06/20/2018 10:37 AM  
 CO2 30 06/20/2018 10:37 AM  
 Anion gap 10 06/20/2018 10:37 AM  
 Glucose 132 (H) 06/20/2018 10:37 AM  
 BUN 12 06/20/2018 10:37 AM  
 Creatinine 0.71 06/20/2018 10:37 AM  
 BUN/Creatinine ratio 17 06/20/2018 10:37 AM  
 GFR est AA >60 06/20/2018 10:37 AM  
 GFR est non-AA >60 06/20/2018 10:37 AM  
 Calcium 10.2 (H) 06/20/2018 10:37 AM  
 Bilirubin, total 0.5 06/20/2018 10:37 AM  
 AST (SGOT) 18 06/20/2018 10:37 AM  
 Alk. phosphatase 91 06/20/2018 10:37 AM  
 Protein, total 7.3 06/20/2018 10:37 AM  
 Albumin 3.6 06/20/2018 10:37 AM  
 Globulin 3.7 06/20/2018 10:37 AM  
 A-G Ratio 1.0 06/20/2018 10:37 AM  
 ALT (SGPT) 24 06/20/2018 10:37 AM  
 
Lab Results Component Value Date/Time WBC 8.7 08/15/2017 12:15 PM  
 WBC 7.1 06/14/2012 09:50 AM  
 HGB 13.6 08/15/2017 12:15 PM  
 HCT 42.2 08/15/2017 12:15 PM  
 PLATELET 358 85/12/5714 12:15 PM  
 MCV 71.4 (L) 08/15/2017 12:15 PM  
 
Lab Results Component Value Date/Time Hemoglobin A1c 5.8 (H) 06/20/2018 10:37 AM  
 Hemoglobin A1c (POC) 5.5 05/13/2016 10:45 AM  
 
Lab Results Component Value Date/Time Cholesterol, total 177 06/20/2018 10:37 AM  
 HDL Cholesterol 78 (H) 06/20/2018 10:37 AM  
 LDL, calculated 77.2 06/20/2018 10:37 AM  
 VLDL, calculated 21.8 06/20/2018 10:37 AM  
 Triglyceride 109 06/20/2018 10:37 AM  
 CHOL/HDL Ratio 2.3 06/20/2018 10:37 AM  
 
ASSESSMENT and PLAN 
  ICD-10-CM ICD-9-CM 1. Osteoarthritis of both knees, unspecified osteoarthritis type M17.0 715.96 REFERRAL TO PHYSICAL THERAPY 2. Essential hypertension I10 401.9 3. Mixed stress and urge urinary incontinence N39.46 788.33   
4. Dry mouth R68.2 527.7 Will continue with current medication. Referral entered to PT due to OA Repeat labs next visit.

## 2019-04-10 NOTE — PATIENT INSTRUCTIONS
Arthritis: Care Instructions Your Care Instructions Arthritis, also called osteoarthritis, is a breakdown of the cartilage that cushions your joints. When the cartilage wears down, your bones rub against each other. This causes pain and stiffness. Many people have some arthritis as they age. Arthritis most often affects the joints of the spine, hands, hips, knees, or feet. You can take simple measures to protect your joints, ease your pain, and help you stay active. Follow-up care is a key part of your treatment and safety. Be sure to make and go to all appointments, and call your doctor if you are having problems. It's also a good idea to know your test results and keep a list of the medicines you take. How can you care for yourself at home? · Stay at a healthy weight. Being overweight puts extra strain on your joints. · Talk to your doctor or physical therapist about exercises that will help ease joint pain. ? Stretch. You may enjoy gentle forms of yoga to help keep your joints and muscles flexible. ? Walk instead of jog. Other types of exercise that are less stressful on the joints include riding a bicycle, swimming, melissa chi, or water exercise. ? Lift weights. Strong muscles help reduce stress on your joints. Stronger thigh muscles, for example, take some of the stress off of the knees and hips. Learn the right way to lift weights so you do not make joint pain worse. · Take your medicines exactly as prescribed. Call your doctor if you think you are having a problem with your medicine. · Take pain medicines exactly as directed. ? If the doctor gave you a prescription medicine for pain, take it as prescribed. ? If you are not taking a prescription pain medicine, ask your doctor if you can take an over-the-counter medicine. · Use a cane, crutch, walker, or another device if you need help to get around. These can help rest your joints.  You also can use other things to make life easier, such as a higher toilet seat and padded handles on kitchen utensils. · Do not sit in low chairs, which can make it hard to get up. · Put heat or cold on your sore joints as needed. Use whichever helps you most. You also can take turns with hot and cold packs. ? Apply heat 2 or 3 times a day for 20 to 30 minutesusing a heating pad, hot shower, or hot packto relieve pain and stiffness. ? Put ice or a cold pack on your sore joint for 10 to 20 minutes at a time. Put a thin cloth between the ice and your skin. When should you call for help? Call your doctor now or seek immediate medical care if: 
  · You have sudden swelling, warmth, or pain in any joint.  
  · You have joint pain and a fever or rash.  
  · You have such bad pain that you cannot use a joint.  
 Watch closely for changes in your health, and be sure to contact your doctor if: 
  · You have mild joint symptoms that continue even with more than 6 weeks of care at home.  
  · You have stomach pain or other problems with your medicine. Where can you learn more? Go to http://alphonso-ami.info/. Enter U445 in the search box to learn more about \"Arthritis: Care Instructions. \" Current as of: Amaya 10, 2018 Content Version: 11.9 © 3833-8064 [employer]. Care instructions adapted under license by [employer] (which disclaims liability or warranty for this information). If you have questions about a medical condition or this instruction, always ask your healthcare professional. [place] [address] any warranty or liability for your use of this information.

## 2019-04-29 ENCOUNTER — TELEPHONE (OUTPATIENT)
Dept: INTERNAL MEDICINE CLINIC | Age: 84
End: 2019-04-29

## 2019-11-22 ENCOUNTER — OFFICE VISIT (OUTPATIENT)
Dept: FAMILY MEDICINE CLINIC | Age: 84
End: 2019-11-22

## 2019-11-22 VITALS
BODY MASS INDEX: 27.66 KG/M2 | DIASTOLIC BLOOD PRESSURE: 81 MMHG | SYSTOLIC BLOOD PRESSURE: 149 MMHG | WEIGHT: 166 LBS | TEMPERATURE: 97.4 F | OXYGEN SATURATION: 100 % | HEART RATE: 71 BPM | RESPIRATION RATE: 14 BRPM | HEIGHT: 65 IN

## 2019-11-22 DIAGNOSIS — E78.5 HYPERLIPIDEMIA, UNSPECIFIED HYPERLIPIDEMIA TYPE: ICD-10-CM

## 2019-11-22 DIAGNOSIS — Z00.00 MEDICARE ANNUAL WELLNESS VISIT, SUBSEQUENT: Primary | ICD-10-CM

## 2019-11-22 DIAGNOSIS — I10 ESSENTIAL HYPERTENSION: ICD-10-CM

## 2019-11-22 DIAGNOSIS — I49.1 PREMATURE ATRIAL CONTRACTIONS: ICD-10-CM

## 2019-11-22 DIAGNOSIS — R73.03 PREDIABETES: ICD-10-CM

## 2019-11-22 DIAGNOSIS — Z71.89 ADVANCE DIRECTIVE DISCUSSED WITH PATIENT: ICD-10-CM

## 2019-11-22 RX ORDER — LISINOPRIL 10 MG/1
10 TABLET ORAL DAILY
Qty: 90 TAB | Refills: 1 | Status: SHIPPED | OUTPATIENT
Start: 2019-11-22 | End: 2020-02-17 | Stop reason: DRUGHIGH

## 2019-11-22 NOTE — PROGRESS NOTES
[facility]    CC: [facility]    HPI:        Prediabetes:  -On no glucose lowering medication  -Checks blood sugar at home  -Reports FBS range of   -Exercises for 4 minutes a day      HTN:  -Taking BP medication as prescribed  -Denies any side effects or issues with BP medication  -Does not check BP at home  -Exercises for 4 minutes a day      HLD:  -Taking statin as prescribed  -Denies any side effects or issues with the statin  -Exercises for 4 minutes a day      ROS: Positive items marked in RED  CON: fever, chills  Cardiovascular: palpitations, CP  Resp: SOB, cough  GI: nausea, vomiting, diarrhea  : dysuria, hematuria      Past Medical History:   Diagnosis Date    ASVD (arteriosclerotic vascular disease)     Breast cancer ([facility] [address]     Left breast Cancer 1996    Diabetes ([facility] [address]     Hypercholesterolemia     Hypertension     Radiation therapy     Post Left breast Cancer 1996    Radiation therapy complication 5420    Lt breast       Past Surgical History:   Procedure Laterality Date    BREAST SURGERY PROCEDURE UNLISTED      Post Left breast cancer 1996    HX BREAST BIOPSY Right ?     Scar on right breast pt does not remember from what    HX BREAST LUMPECTOMY Left 1996    breast cancer    HX CATARACT REMOVAL      HX ORTHOPAEDIC         Family History   Problem Relation Age of Onset    Diabetes Sister     Diabetes Brother        Social History     Socioeconomic History    Marital status:      Spouse name: Not on file    Number of children: Not on file    Years of education: Not on file    Highest education level: Not on file   Tobacco Use    Smoking status: Never Smoker    Smokeless tobacco: Never Used   Substance and Sexual Activity    Alcohol use: No    Drug use: No    Sexual activity: Never       Allergies   Allergen Reactions    Seafood Swelling    Shellfish Derived Swelling    Sulfa (Sulfonamide Antibiotics) Swelling     Swelling in mouth Current Outpatient Medications:     mirabegron ER (MYRBETRIQ) 50 mg ER tablet, Take 1 Tab by mouth daily. , Disp: 90 Tab, Rfl: 3    potassium chloride (K-DUR, KLOR-CON) 20 mEq tablet, take 1 tablet by mouth twice a day, Disp: 120 Tab, Rfl: 5    simvastatin (ZOCOR) 20 mg tablet, take 1 tablet by mouth at bedtime, Disp: 90 Tab, Rfl: 3    atenolol (TENORMIN) 50 mg tablet, Take 1 Tab by mouth daily. , Disp: 90 Tab, Rfl: 3    mineral oil-hydrophil petrolat (AQUAPHOR) ointment, Apply  to affected area as needed for Dry Skin., Disp: , Rfl:     cholecalciferol, VITAMIN D3, (VITAMIN D3) 5,000 unit tab tablet, Take  by mouth daily. , Disp: , Rfl:     cycloSPORINE (RESTASIS) 0.05 % ophthalmic emulsion, Administer 1 Drop to both eyes two (2) times a day., Disp: , Rfl:     glucose blood VI test strips (ONE TOUCH ULTRA TEST) strip, Once daily, Disp: 1 Package, Rfl: 5    glucose blood VI test strips (ASCENSIA CONTOUR) strip, by Does Not Apply route daily. , Disp: 1 Package, Rfl: 11    omega-3 fatty acids-vitamin e (FISH OIL) 1,000 mg Cap, Take 1 Cap by mouth., Disp: , Rfl:     calcium 500 mg Tab, Take  by mouth., Disp: , Rfl:     aspirin 81 mg tablet, Take 81 mg by mouth., Disp: , Rfl:     multivitamin (ONE A DAY) tablet, Take 1 Tab by mouth daily. , Disp: , Rfl:     Blood-Glucose Meter (CONTOUR METER) monitoring kit, by Does Not Apply route.  Monitor daily, Disp: 1 Kit, Rfl: 0    ipratropium (ATROVENT) 42 mcg (0.06 %) nasal spray, INSTILL 2 SPRAYS INTO EACH NOSTRIL 4 TIMES A DAY, Disp: 15 mL, Rfl: 5    VITS A,C,E/ZINC/COPPER (VISION-RILEY PRESERVE PO), Take  by mouth., Disp: , Rfl:     OTHER, OTC Blue-EMU super strength topical cream, Disp: , Rfl:     VIT A/VIT C/VIT E/ZINC/COPPER (ICAPS AREDS PO), Take  by mouth., Disp: , Rfl:     loratadine (CLARITIN) 10 mg tablet, Take 10 mg by mouth., Disp: , Rfl:     acetaminophen (TYLENOL) 500 mg tablet, Take 1 Tab by mouth every six (6) hours as needed for Pain., Disp: 30 Tab, Rfl: 0    diclofenac (VOLTAREN) 1 % gel, Apply  to affected area four (4) times daily. , Disp: 2 g, Rfl: 0    fexofenadine (ALLEGRA) 180 mg tablet, Take 1 Tab by mouth daily. , Disp: 60 Tab, Rfl: 3    pentoxifylline CR (TRENTAL) 400 mg CR tablet, Take 400 mg by mouth three (3) times daily (with meals). , Disp: , Rfl:     Physical Exam:      /81   Pulse 71   Temp 97.4 °F (36.3 °C) (Oral)   Resp 14   Ht 5' 5\" (1.651 m)   Wt 166 lb (75.3 kg)   SpO2 100%   BMI 27.62 kg/m²     General:  WD, WN, NAD, conversant  Eyes: sclera clear bilaterally, no discharge noted, eyelids normal in appearance  HENT: NCAT  Lungs: CTAB, normal respiratory effort and rate  CV: irregularly irregular, no MRGs  ABD: soft, non-tender, non-distended, normal bowel sounds  Skin: normal temperature, turgor, color, and texture  Psych: alert and oriented to person, place and situation, normal affect  Neuro: speech normal, moving all extremities    EKG (11/22/2019):  normal sinus rhythm, PAC's noted. Assessment/Plan     Prediabetes:  -FBS at goal  -Will continue to manage with lifestyle changes  -Plan for HGBA1c at next visit  -Handout given on prediabetes care  -F/U in 3 months      HTN, Inadequately Controlled:  -Atenolol discontinued  -Started on 10 mg of lisinopril daily  -Plan for CBC, CMP, and fasting lipid panel at next visit  -F/U in 3 months      HLD:  -Will continue current statin regimen  -Plan for CMP and fasting lipid panel at next visit  -F/U in 3 months      PACs:  -Counseled on finding  -Handout given on premature heartbeat care  -F/U in 3 months        Michelle Angeles MD  11/22/2019, 12:40 PM        This is the Subsequent Medicare Annual Wellness Exam, performed 12 months or more after the Initial AWV or the last Subsequent AWV    I have reviewed the patient's medical history in detail and updated the computerized patient record.      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 Scar on right breast pt does not remember from what    HX BREAST LUMPECTOMY Left 1996    breast cancer    HX CATARACT REMOVAL      HX ORTHOPAEDIC       Current Outpatient Medications   Medication Sig Dispense Refill    mirabegron ER (MYRBETRIQ) 50 mg ER tablet Take 1 Tab by mouth daily. 90 Tab 3    potassium chloride (K-DUR, KLOR-CON) 20 mEq tablet take 1 tablet by mouth twice a day 120 Tab 5    simvastatin (ZOCOR) 20 mg tablet take 1 tablet by mouth at bedtime 90 Tab 3    atenolol (TENORMIN) 50 mg tablet Take 1 Tab by mouth daily. 90 Tab 3    mineral oil-hydrophil petrolat (AQUAPHOR) ointment Apply  to affected area as needed for Dry Skin.  cholecalciferol, VITAMIN D3, (VITAMIN D3) 5,000 unit tab tablet Take  by mouth daily.  cycloSPORINE (RESTASIS) 0.05 % ophthalmic emulsion Administer 1 Drop to both eyes two (2) times a day.  glucose blood VI test strips (ONE TOUCH ULTRA TEST) strip Once daily 1 Package 5    glucose blood VI test strips (ASCENSIA CONTOUR) strip by Does Not Apply route daily. 1 Package 11    omega-3 fatty acids-vitamin e (FISH OIL) 1,000 mg Cap Take 1 Cap by mouth.  calcium 500 mg Tab Take  by mouth.  aspirin 81 mg tablet Take 81 mg by mouth.  multivitamin (ONE A DAY) tablet Take 1 Tab by mouth daily.  Blood-Glucose Meter (CONTOUR METER) monitoring kit by Does Not Apply route.  Monitor daily 1 Kit 0    ipratropium (ATROVENT) 42 mcg (0.06 %) nasal spray INSTILL 2 SPRAYS INTO EACH NOSTRIL 4 TIMES A DAY 15 mL 5    VITS A,C,E/ZINC/COPPER (VISION-RILEY PRESERVE PO) Take  by mouth.  OTHER OTC Blue-EMU super strength topical cream      VIT A/VIT C/VIT E/ZINC/COPPER (ICAPS AREDS PO) Take  by mouth.  loratadine (CLARITIN) 10 mg tablet Take 10 mg by mouth.  acetaminophen (TYLENOL) 500 mg tablet Take 1 Tab by mouth every six (6) hours as needed for Pain. 30 Tab 0    diclofenac (VOLTAREN) 1 % gel Apply  to affected area four (4) times daily. 2 g 0    fexofenadine (ALLEGRA) 180 mg tablet Take 1 Tab by mouth daily. 60 Tab 3    pentoxifylline CR (TRENTAL) 400 mg CR tablet Take 400 mg by mouth three (3) times daily (with meals). Allergies   Allergen Reactions    Seafood Swelling    Shellfish Derived Swelling    Sulfa (Sulfonamide Antibiotics) Swelling     Swelling in mouth       Family History   Problem Relation Age of Onset    Diabetes Sister     Diabetes Brother      Social History     Tobacco Use    Smoking status: Never Smoker    Smokeless tobacco: Never Used   Substance Use Topics    Alcohol use: No       Depression Risk Factor Screening:     3 most recent PHQ Screens 11/22/2019   Little interest or pleasure in doing things Not at all   Feeling down, depressed, irritable, or hopeless Not at all   Total Score PHQ 2 0       Alcohol Risk Factor Screening:   Do you average 1 drink per night or more than 7 drinks a week:  No    On any one occasion in the past three months have you have had more than 3 drinks containing alcohol:  No      Functional Ability and Level of Safety:   Hearing: Hearing is good. Whisper Test done with normal results. Activities of Daily Living: The home contains: no safety equipment. Patient does total self care    Ambulation: with mild difficulty Uses a cane to help with balance    Fall Risk:  Fall Risk Assessment, last 12 mths 11/22/2019   Able to walk?  Yes   Fall in past 15 months? No       Abuse Screen:  Patient is not abused    Cognitive Screening   Has your family/caregiver stated any concerns about your memory: no  Cognitive Screening: Normal    Patient Care Team   Patient Care Team:  Maryjane Hawthorne MD as PCP - General (Family Practice)  Benigno Munoz RN as Nurse Navigator  Greta Dalton MD (Ophthalmology)  Chantal Read DPM (Podiatry)  Stone Leach MD (Obstetrics & Gynecology)  Ganga Weeks MD (Ophthalmology)    Assessment/Plan   Education and counseling provided:  Are appropriate based on today's review and evaluation  End-of-Life planning (with patient's consent)       Diagnoses and all orders for this visit:    1. Medicare annual wellness visit, subsequent    2.  Advance directive discussed with patient        Health Maintenance Due   Topic Date Due    MEDICARE YEARLY EXAM  02/16/2019    GLAUCOMA SCREENING Q2Y  11/02/2019

## 2019-11-22 NOTE — PROGRESS NOTES
1. Have you been to the ER, urgent care clinic since your last visit? Hospitalized since your last visit? No    2. Have you seen or consulted any other health care providers outside of the [address] [place] [address] since your last visit? Include any pap smears or colon screening.  No

## 2019-11-22 NOTE — PATIENT INSTRUCTIONS
Medicare Wellness Visit, Female The best way to live healthy is to have a lifestyle where you eat a well-balanced diet, exercise regularly, limit alcohol use, and quit all forms of tobacco/nicotine, if applicable. Regular preventive services are another way to keep healthy. Preventive services (vaccines, screening tests, monitoring & exams) can help personalize your care plan, which helps you manage your own care. Screening tests can find health problems at the earliest stages, when they are easiest to treat. [facility] follows the current, evidence-based guidelines published by the [facility] ([facility]) when recommending preventive services for our patients. Because we follow these guidelines, sometimes recommendations change over time as research supports it. (For example, mammograms used to be recommended annually. Even though Medicare will still pay for an annual mammogram, the newer guidelines recommend a mammogram every two years for women of average risk). Of course, you and your doctor may decide to screen more often for some diseases, based on your risk and your co-morbidities (chronic disease you are already diagnosed with). Preventive services for you include: - Medicare offers their members a free annual wellness visit, which is time for you and your primary care provider to discuss and plan for your preventive service needs. Take advantage of this benefit every year! 
-All adults over the age of 72 should receive the recommended pneumonia vaccines. Current USPSTF guidelines recommend a series of two vaccines for the best pneumonia protection.  
-All adults should have a flu vaccine yearly and a tetanus vaccine every 10 years.  
-All adults age 48 and older should receive the shingles vaccines (series of two vaccines). -All adults age 38-68 who are overweight should have a diabetes screening test once every three years. -All adults born between [birth year] and 1965 should be screened once for Hepatitis C. 
-Other screening tests and preventive services for persons with diabetes include: an eye exam to screen for diabetic retinopathy, a kidney function test, a foot exam, and stricter control over your cholesterol.  
-Cardiovascular screening for adults with routine risk involves an electrocardiogram (ECG) at intervals determined by your doctor.  
-Colorectal cancer screenings should be done for adults age 54-65 with no increased risk factors for colorectal cancer. There are a number of acceptable methods of screening for this type of cancer. Each test has its own benefits and drawbacks. Discuss with your doctor what is most appropriate for you during your annual wellness visit. The different tests include: colonoscopy (considered the best screening method), a fecal occult blood test, a fecal DNA test, and sigmoidoscopy. 
 
-A bone mass density test is recommended when a woman turns 65 to screen for osteoporosis. This test is only recommended one time, as a screening. Some providers will use this same test as a disease monitoring tool if you already have osteoporosis. -Breast cancer screenings are recommended every other year for women of normal risk, age 54-69. 
-Cervical cancer screenings for women over age 72 are only recommended with certain risk factors. Here is a list of your current Health Maintenance items (your personalized list of preventive services) with a due date: 
Health Maintenance Due Topic Date Due  
 Annual Well Visit  02/16/2019  Glaucoma Screening   11/02/2019 Advance Directives: Care Instructions Your Care Instructions An advance directive is a legal way to state your wishes at the end of your life. It tells your family and your doctor what to do if you can no longer say what you want. There are two main types of advance directives. You can change them any time that your wishes change. · A living will tells your family and your doctor your wishes about life support and other treatment. · A durable power of  for health care lets you name a person to make treatment decisions for you when you can't speak for yourself. This person is called a health care agent. If you do not have an advance directive, decisions about your medical care may be made by a doctor or a  who doesn't know you. It may help to think of an advance directive as a gift to the people who care for you. If you have one, they won't have to make tough decisions by themselves. Follow-up care is a key part of your treatment and safety. Be sure to make and go to all appointments, and call your doctor if you are having problems. It's also a good idea to know your test results and keep a list of the medicines you take. How can you care for yourself at home? · Discuss your wishes with your loved ones and your doctor. This way, there are no surprises. · Many states have a unique form. Or you might use a universal form that has been approved by many states. This kind of form can sometimes be completed and stored online. Your electronic copy will then be available wherever you have a connection to the Internet. In most cases, doctors will respect your wishes even if you have a form from a different state. · You don't need a  to do an advance directive. But you may want to get legal advice. · Think about these questions when you prepare an advance directive: 
? Who do you want to make decisions about your medical care if you are not able to? Many people choose a family member or close friend. ? Do you know enough about life support methods that might be used? If not, talk to your doctor so you understand. ? What are you most afraid of that might happen? You might be afraid of having pain, losing your independence, or being kept alive by machines. ? Where would you prefer to die? Choices include your home, a hospital, or a nursing home. ? Would you like to have information about hospice care to support you and your family? ? Do you want to donate organs when you die? ? Do you want certain Anglican practices performed before you die? If so, put your wishes in the advance directive. · Read your advance directive every year, and make changes as needed. When should you call for help? Be sure to contact your doctor if you have any questions. Where can you learn more? Go to http://alphonso-ami.info/. Enter R264 in the search box to learn more about \"Advance Directives: Care Instructions. \" Current as of: April 1, 2019 Content Version: 12.2 © 7327-9439 Healthwise, Incorporated. Care instructions adapted under license by [employer] (which disclaims liability or warranty for this information). If you have questions about a medical condition or this instruction, always ask your healthcare professional. [place] [address] any warranty or liability for your use of this information. Prediabetes: Care Instructions Your Care Instructions Prediabetes is a warning sign that you are at risk for getting type 2 diabetes. It means that your blood sugar is higher than it should be. The food you eat turns into sugar, which your body uses for energy. Normally, an organ called the pancreas makes insulin, which allows the sugar in your blood to get into your body's cells. But when your body can't use insulin the right way, the sugar doesn't move into cells. It stays in your blood instead. This is called insulin resistance. The buildup of sugar in the blood causes prediabetes. The good news is that lifestyle changes may help you get your blood sugar back to normal and help you avoid or delay diabetes. Follow-up care is a key part of your treatment and safety.  Be sure to make and go to all appointments, and call your doctor if you are having problems. It's also a good idea to know your test results and keep a list of the medicines you take. How can you care for yourself at home? · Watch your weight. A healthy weight helps your body use insulin properly. · Limit the amount of calories, sweets, and unhealthy fat you eat. Ask your doctor if you should see a dietitian. A registered dietitian can help you create meal plans that fit your lifestyle. · Get at least 30 minutes of exercise on most days of the week. Exercise helps control your blood sugar. It also helps you maintain a healthy weight. Walking is a good choice. You also may want to do other activities, such as running, swimming, cycling, or playing tennis or team sports. · Do not smoke. Smoking can make prediabetes worse. If you need help quitting, talk to your doctor about stop-smoking programs and medicines. These can increase your chances of quitting for good. · If your doctor prescribed medicines, take them exactly as prescribed. Call your doctor if you think you are having a problem with your medicine. You will get more details on the specific medicines your doctor prescribes. When should you call for help? Watch closely for changes in your health, and be sure to contact your doctor if: 
  · You have any symptoms of diabetes. These may include: 
? Being thirsty more often. ? Urinating more. ? Being hungrier. ? Losing weight. ? Being very tired. ? Having blurry vision.  
  · You have a wound that will not heal.  
  · You have an infection that will not go away.  
  · You have problems with your blood pressure.  
  · You want more information about diabetes and how you can keep from getting it. Where can you learn more? Go to http://alphonso-ami.info/. Enter I222 in the search box to learn more about \"Prediabetes: Care Instructions. \" Current as of: April 16, 2019 Content Version: 12.2 © 9782-2649 Healthwise, [employer]. Care instructions adapted under license by [employer] (which disclaims liability or warranty for this information). If you have questions about a medical condition or this instruction, always ask your healthcare professional. [address] any warranty or liability for your use of this information. Premature Heartbeat: Care Instructions Your Care Instructions A premature heartbeat happens when the heart beats earlier than it should. This briefly interrupts the heart's rhythm. You do not usually feel the early heartbeat, and the next beat is stronger. To many people, this feels like a skipped heartbeat or a flutter. This heartbeat is also called a premature ventricular contraction (PVC). If you have no heart problems, premature heartbeats that happen once in a while are not a cause for concern. Most people have them at some time. They may happen more often if you drink a lot of caffeine or alcohol or are under stress. Usually, no cause for a premature heartbeat is found, and no treatment is needed. Some people may take medicine to prevent these heartbeats and to relieve symptoms. Follow-up care is a key part of your treatment and safety. Be sure to make and go to all appointments, and call your doctor if you are having problems. It's also a good idea to know your test results and keep a list of the medicines you take. How can you care for yourself at home? · Limit caffeine and other stimulants if they trigger premature heartbeats. · Reduce stress. Avoid people and places that make you feel anxious, if you can. Learn ways to reduce stress, such as biofeedback, guided imagery, and meditation. · Do not smoke or allow others to smoke around you. If you need help quitting, talk to your doctor about stop-smoking programs and medicines. These can increase your chances of quitting for good. · Get at least 30 minutes of exercise on most days of the week. Walking is a good choice. You also may want to do other activities, such as running, swimming, cycling, or playing tennis or team sports. · Eat heart-healthy foods. · Stay at a healthy weight. Lose weight if you need to. · Get enough sleep. Keep your room dark and quiet, and try to go to bed at the same time every night. · Limit alcohol to 2 drinks a day for men and 1 drink a day for women. Too much alcohol can cause health problems. If drinking alcohol causes more premature heartbeats, do not drink it. · If your doctor prescribes medicine, take it exactly as prescribed. Call your doctor if you think you are having a problem with your medicine. When should you call for help? Call 911 anytime you think you may need emergency care. For example, call if: 
  · You passed out (lost consciousness).  
 Call your doctor now or seek immediate medical care if: 
  · You are dizzy or lightheaded, or you feel like you may faint.  
  · You are short of breath.  
 Watch closely for changes in your health, and be sure to contact your doctor if you have any problems. Where can you learn more? Go to http://alphonso-ami.info/. Enter C307 in the search box to learn more about \"Premature Heartbeat: Care Instructions. \" Current as of: April 9, 2019 Content Version: 12.2 © 1183-0759 [employer]. Care instructions adapted under license by [employer] (which disclaims liability or warranty for this information). If you have questions about a medical condition or this instruction, always ask your healthcare professional. [address] any warranty or liability for your use of this information.

## 2019-12-04 NOTE — ACP (ADVANCE CARE PLANNING)
Advance Care Planning (ACP) Provider Note - Comprehensive     Date of ACP Conversation: 11/22/19  Persons included in Conversation:  patient  Length of ACP Conversation in minutes:  <16 minutes (Non-Billable)    Authorized Decision Maker (if patient is incapable of making informed decisions):    This person is:  Son - Smita Cloud          General ACP for ALL Patients with Decision Making Capacity:  Thinks she wants to be a DNR, but wishes to discuss issue with her son      Review of Existing Advance Directive:  N/A    For Serious or Chronic Illness:  No known illness    Interventions Provided:  Recommended completion of Advance Directive form after review of ACP materials and conversation with prospective healthcare agent

## 2020-02-17 ENCOUNTER — OFFICE VISIT (OUTPATIENT)
Dept: FAMILY MEDICINE CLINIC | Age: 85
End: 2020-02-17

## 2020-02-17 VITALS
OXYGEN SATURATION: 96 % | DIASTOLIC BLOOD PRESSURE: 87 MMHG | WEIGHT: 162 LBS | TEMPERATURE: 97.4 F | RESPIRATION RATE: 14 BRPM | HEART RATE: 115 BPM | BODY MASS INDEX: 26.99 KG/M2 | SYSTOLIC BLOOD PRESSURE: 145 MMHG | HEIGHT: 65 IN

## 2020-02-17 DIAGNOSIS — R73.03 PREDIABETES: ICD-10-CM

## 2020-02-17 DIAGNOSIS — E78.5 HYPERLIPIDEMIA, UNSPECIFIED HYPERLIPIDEMIA TYPE: ICD-10-CM

## 2020-02-17 DIAGNOSIS — I10 ESSENTIAL HYPERTENSION: Primary | ICD-10-CM

## 2020-02-17 RX ORDER — LISINOPRIL 20 MG/1
20 TABLET ORAL DAILY
Qty: 30 TAB | Refills: 3 | Status: SHIPPED | OUTPATIENT
Start: 2020-02-17

## 2020-02-17 NOTE — PROGRESS NOTES
1. Have you been to the ER, urgent care clinic since your last visit? Hospitalized since your last visit? No    2. Have you seen or consulted any other health care providers outside of the [address] [place] [address] since your last visit? Include any pap smears or colon screening.  No

## 2020-02-18 NOTE — PROGRESS NOTES
[facility]    CC: F/U for Chronic Disease Management    HPI:     Prediabetes:  -On no glucose lowering medication  -Checks blood sugar at home. No log brought in for review. -Reports FBS range of 90s-100s  -Exercises for 2 minutes a day  -Diet unchanged since last visit       HTN:  -Taking BP medication as prescribed  -Denies any side effects or issues with BP medication  -Does not check BP at home  -Exercises for 2 minutes a day  -Diet unchanged since last visit        HLD:  -Taking statin as prescribed  -Denies any side effects or issues with the statin  -Exercises for 2 minutes a day  -Diet unchanged since last visit      ROS: Positive items marked in RED  CON: fever, chills  Cardiovascular: palpitations, CP  Resp: SOB, cough  GI: nausea, vomiting, diarrhea  : dysuria, hematuria    Past Medical History:   Diagnosis Date    ASVD (arteriosclerotic vascular disease)     Breast cancer ([facility] [address]     Left breast Cancer 1996    Hypercholesterolemia     Hypertension     Prediabetes     Radiation therapy     Post Left breast Cancer 1996    Radiation therapy complication 3989    Lt breast       Past Surgical History:   Procedure Laterality Date    BREAST SURGERY PROCEDURE UNLISTED      Post Left breast cancer 1996    HX BREAST BIOPSY Right ?     Scar on right breast pt does not remember from what    HX BREAST LUMPECTOMY Left 1996    breast cancer    HX CATARACT REMOVAL      HX ORTHOPAEDIC         Family History   Problem Relation Age of Onset    Diabetes Sister     Diabetes Brother        Social History     Socioeconomic History    Marital status:      Spouse name: Not on file    Number of children: Not on file    Years of education: Not on file    Highest education level: Not on file   Tobacco Use    Smoking status: Never Smoker    Smokeless tobacco: Never Used   Substance and Sexual Activity    Alcohol use: No    Drug use: No    Sexual activity: Never       Allergies Allergen Reactions    Seafood Swelling    Shellfish Derived Swelling    Sulfa (Sulfonamide Antibiotics) Swelling     Swelling in mouth         Current Outpatient Medications:     lisinopril (PRINIVIL, ZESTRIL) 20 mg tablet, Take 1 Tab by mouth daily. , Disp: 30 Tab, Rfl: 3    mirabegron ER (MYRBETRIQ) 50 mg ER tablet, Take 1 Tab by mouth daily. , Disp: 90 Tab, Rfl: 3    potassium chloride (K-DUR, KLOR-CON) 20 mEq tablet, take 1 tablet by mouth twice a day, Disp: 120 Tab, Rfl: 5    simvastatin (ZOCOR) 20 mg tablet, take 1 tablet by mouth at bedtime, Disp: 90 Tab, Rfl: 3    VITS A,C,E/ZINC/COPPER (VISION-RILEY PRESERVE PO), Take  by mouth., Disp: , Rfl:     VIT A/VIT C/VIT E/ZINC/COPPER (ICAPS AREDS PO), Take  by mouth., Disp: , Rfl:     cholecalciferol, VITAMIN D3, (VITAMIN D3) 5,000 unit tab tablet, Take  by mouth daily. , Disp: , Rfl:     cycloSPORINE (RESTASIS) 0.05 % ophthalmic emulsion, Administer 1 Drop to both eyes two (2) times a day., Disp: , Rfl:     glucose blood VI test strips (ONE TOUCH ULTRA TEST) strip, Once daily, Disp: 1 Package, Rfl: 5    glucose blood VI test strips (ASCENSIA CONTOUR) strip, by Does Not Apply route daily. , Disp: 1 Package, Rfl: 11    omega-3 fatty acids-vitamin e (FISH OIL) 1,000 mg Cap, Take 1 Cap by mouth., Disp: , Rfl:     calcium 500 mg Tab, Take  by mouth., Disp: , Rfl:     aspirin 81 mg tablet, Take 81 mg by mouth., Disp: , Rfl:     multivitamin (ONE A DAY) tablet, Take 1 Tab by mouth daily. , Disp: , Rfl:     Blood-Glucose Meter (CONTOUR METER) monitoring kit, by Does Not Apply route.  Monitor daily, Disp: 1 Kit, Rfl: 0    mineral oil-hydrophil petrolat (AQUAPHOR) ointment, Apply  to affected area as needed for Dry Skin., Disp: , Rfl:     OTHER, OTC Blue-EMU super strength topical cream, Disp: , Rfl:     loratadine (CLARITIN) 10 mg tablet, Take 10 mg by mouth., Disp: , Rfl:     acetaminophen (TYLENOL) 500 mg tablet, Take 1 Tab by mouth every six (6) hours as needed for Pain., Disp: 30 Tab, Rfl: 0    pentoxifylline CR (TRENTAL) 400 mg CR tablet, Take 400 mg by mouth three (3) times daily (with meals). , Disp: , Rfl:     Physical Exam:      /87   Pulse (!) 115   Temp 97.4 °F (36.3 °C) (Oral)   Resp 14   Ht 5' 5\" (1.651 m)   Wt 162 lb (73.5 kg)   SpO2 96%   BMI 26.96 kg/m²     General:  WD, WN, NAD, conversant  Eyes: sclera clear bilaterally, no discharge noted, eyelids normal in appearance  HENT: NCAT  Lungs: CTAB, normal respiratory effort and rate  CV: RRR, no MRGs  ABD: soft, non-tender, non-distended, normal bowel sounds  Skin: normal temperature, turgor, color, and texture  Psych: alert and oriented to person, place and situation, normal affect  Neuro: speech normal, moving all extremities, ambulating with cane      Assessment/Plan     HTN, Inadequately Controlled:  -Will increase lisinopril dose to 20 mg  -HGBA1c, CBC, CMP, and fasting lipid panel ordered  -F/U in 1 month      Prediabetes:  -Reported FBS at goal  -Will continue to manage with lifestyle changes  -HGBA1c, CBC, and CMP ordered  -F/U in 1 month       HLD:  -Will continue current statin regimen  -CMP and fasting lipid panel ordered  -F/U in 1 month        John Reese MD  2/17/2020

## 2020-02-19 ENCOUNTER — DOCUMENTATION ONLY (OUTPATIENT)
Dept: FAMILY MEDICINE CLINIC | Age: 85
End: 2020-02-19

## 2020-02-19 NOTE — PROGRESS NOTES
[facility] is due to GrandDaughter (From [place]) is in town for only 1 week,until Sunday. Facility requires documentation to be completed 5 days from [id number] office visit.

## 2020-06-01 ENCOUNTER — HOSPITAL ENCOUNTER (OUTPATIENT)
Dept: LAB | Age: 85
Discharge: HOME | End: 2020-06-01

## 2020-06-01 LAB
BASOPHILS # BLD: 0 K/UL (ref 0–0.1)
BASOPHILS NFR BLD: 0 % (ref 0–2)
DIFFERENTIAL METHOD BLD: ABNORMAL
EOSINOPHIL # BLD: 0.1 K/UL (ref 0–0.4)
EOSINOPHIL NFR BLD: 1 % (ref 0–5)
ERYTHROCYTE [DISTWIDTH] IN BLOOD BY AUTOMATED COUNT: 16.9 % (ref 11.6–14.5)
HCT VFR BLD AUTO: 28.4 % (ref 35–45)
HGB BLD-MCNC: 8.7 G/DL (ref 12–16)
LYMPHOCYTES # BLD: 2 K/UL (ref 0.9–3.6)
LYMPHOCYTES NFR BLD: 16 % (ref 21–52)
MCH RBC QN AUTO: 22.6 PG (ref 24–34)
MCHC RBC AUTO-ENTMCNC: 30.6 G/DL (ref 31–37)
MCV RBC AUTO: 73.8 FL (ref 74–97)
MONOCYTES # BLD: 1.2 K/UL (ref 0.05–1.2)
MONOCYTES NFR BLD: 10 % (ref 3–10)
NEUTS SEG # BLD: 9.1 K/UL (ref 1.8–8)
NEUTS SEG NFR BLD: 73 % (ref 40–73)
PLATELET # BLD AUTO: 554 K/UL (ref 135–420)
PMV BLD AUTO: 9.6 FL (ref 9.2–11.8)
RBC # BLD AUTO: 3.85 M/UL (ref 4.2–5.3)
WBC # BLD AUTO: 12.5 K/UL (ref 4.6–13.2)

## 2020-06-01 PROCEDURE — 85025 COMPLETE CBC W/AUTO DIFF WBC: CPT

## 2020-06-26 ENCOUNTER — HOSPITAL ENCOUNTER (OUTPATIENT)
Dept: LAB | Age: 85
Discharge: HOME | End: 2020-06-26

## 2020-06-26 LAB
APPEARANCE UR: NORMAL
BILIRUB UR QL: NEGATIVE
COLOR UR: YELLOW
GLUCOSE UR STRIP.AUTO-MCNC: NEGATIVE MG/DL
HGB UR QL STRIP: NEGATIVE
KETONES UR QL STRIP.AUTO: NEGATIVE MG/DL
LEUKOCYTE ESTERASE UR QL STRIP.AUTO: NEGATIVE
NITRITE UR QL STRIP.AUTO: NEGATIVE
PH UR STRIP: 5 [PH] (ref 5–8)
PROT UR STRIP-MCNC: NEGATIVE MG/DL
SP GR UR REFRACTOMETRY: 1.02 (ref 1–1.03)
UROBILINOGEN UR QL STRIP.AUTO: 0.2 EU/DL (ref 0.2–1)

## 2020-06-26 PROCEDURE — 81003 URINALYSIS AUTO W/O SCOPE: CPT

## 2021-03-03 ENCOUNTER — HOSPITAL ENCOUNTER (OUTPATIENT)
Dept: LAB | Age: 86
Discharge: HOME | End: 2021-03-03
Payer: MEDICARE

## 2021-03-03 PROCEDURE — 88305 TISSUE EXAM BY PATHOLOGIST: CPT

## 2022-04-13 ENCOUNTER — TELEPHONE (OUTPATIENT)
Dept: FAMILY MEDICINE CLINIC | Age: 87
End: 2022-04-13